# Patient Record
Sex: FEMALE | Race: WHITE | NOT HISPANIC OR LATINO | Employment: OTHER | URBAN - METROPOLITAN AREA
[De-identification: names, ages, dates, MRNs, and addresses within clinical notes are randomized per-mention and may not be internally consistent; named-entity substitution may affect disease eponyms.]

---

## 2017-02-08 RX ORDER — FLUTICASONE PROPIONATE 50 MCG
SPRAY, SUSPENSION (ML) NASAL
Qty: 1 BOTTLE | Refills: 1 | Status: SHIPPED | OUTPATIENT
Start: 2017-02-08 | End: 2017-03-14 | Stop reason: SDUPTHER

## 2017-03-02 ENCOUNTER — OFFICE VISIT (OUTPATIENT)
Dept: INTERNAL MEDICINE | Facility: CLINIC | Age: 82
End: 2017-03-02
Payer: MEDICARE

## 2017-03-02 VITALS
HEIGHT: 62 IN | RESPIRATION RATE: 16 BRPM | OXYGEN SATURATION: 98 % | DIASTOLIC BLOOD PRESSURE: 72 MMHG | BODY MASS INDEX: 21.06 KG/M2 | TEMPERATURE: 98 F | HEART RATE: 79 BPM | WEIGHT: 114.44 LBS | SYSTOLIC BLOOD PRESSURE: 140 MMHG

## 2017-03-02 DIAGNOSIS — R05.3 COUGH, PERSISTENT: Primary | ICD-10-CM

## 2017-03-02 PROCEDURE — 99213 OFFICE O/P EST LOW 20 MIN: CPT | Mod: S$GLB,,, | Performed by: INTERNAL MEDICINE

## 2017-03-02 RX ORDER — CODEINE PHOSPHATE AND GUAIFENESIN 10; 100 MG/5ML; MG/5ML
5 SOLUTION ORAL
Qty: 180 ML | Refills: 0 | Status: SHIPPED | OUTPATIENT
Start: 2017-03-02 | End: 2017-03-12

## 2017-03-02 RX ORDER — ROSUVASTATIN CALCIUM 10 MG/1
1 TABLET, FILM COATED ORAL DAILY
Refills: 6 | Status: ON HOLD | COMMUNITY
Start: 2017-02-07 | End: 2019-12-06 | Stop reason: HOSPADM

## 2017-03-02 NOTE — PROGRESS NOTES
"Subjective:      Patient ID: Mauri Matias is a 81 y.o. female.    Chief Complaint: Cough (pt c/o oughting for three days); Nasal Congestion; and Sore Throat    HPI: 81y/oWF, ill for 3 days with nasal congestion,sore throat and persistent cough.  No fever,chills,headache or earache.  Contact 2 weeks ago with a child, but child was asymptomatic at the time.  Tried "left over Vicodin" so she could sleep and this worked very well.    Review of Systems   Constitutional: Negative for chills, diaphoresis, fatigue and fever.   HENT: Positive for congestion and sore throat. Negative for ear pain, postnasal drip and sinus pressure.    Eyes: Negative.    Respiratory: Positive for cough. Negative for chest tightness, shortness of breath and wheezing.    Cardiovascular: Negative for chest pain and palpitations.   Gastrointestinal: Negative.    Endocrine: Negative.    Genitourinary: Negative.    Musculoskeletal: Negative for arthralgias, back pain, myalgias and neck pain.   Skin: Negative.    Allergic/Immunologic: Negative.    Neurological: Negative for dizziness, weakness, light-headedness and headaches.       Objective:   BP (!) 140/72 (BP Location: Left arm, Patient Position: Sitting, BP Method: Manual)  Pulse 79  Temp 98.2 °F (36.8 °C) (Oral)   Resp 16  Ht 5' 2" (1.575 m)  Wt 51.9 kg (114 lb 6.7 oz)  SpO2 98%  BMI 20.93 kg/m2    Physical Exam   Constitutional: She is oriented to person, place, and time. She appears well-developed and well-nourished. No distress.   HENT:   Head: Normocephalic and atraumatic.   Right Ear: External ear normal.   Left Ear: External ear normal.   Nose: Nose normal.   Mouth/Throat: Oropharynx is clear and moist.   Eyes: Conjunctivae and EOM are normal.   Neck: Normal range of motion. Neck supple. No JVD present.   Cardiovascular: Normal rate, regular rhythm and normal heart sounds.    Pulmonary/Chest: Effort normal and breath sounds normal. She has no wheezes. She has no rales. "   Abdominal: Soft. Bowel sounds are normal.   Musculoskeletal: Normal range of motion.   Lymphadenopathy:     She has no cervical adenopathy.   Neurological: She is alert and oriented to person, place, and time.   Skin: Skin is warm and dry. She is not diaphoretic.   Psychiatric: She has a normal mood and affect. Her behavior is normal.   Nursing note and vitals reviewed.      Assessment:     1. Cough, persistent      Plan:     Cough, persistent  -     guaifenesin-codeine 100-10 mg/5 ml (TUSSI-ORGANIDIN NR)  mg/5 mL syrup; Take 5 mLs by mouth 2 times daily 2 hours after meal.  Dispense: 180 mL; Refill: 0  -     POCT Influenza A/B    Fluids,rest,return if fever,chills,etc....

## 2017-03-02 NOTE — MR AVS SNAPSHOT
[facility]  [address] ,  [address]  [place] [address]  Phone: [phone]  Fax: [phone]                  Mauri Matias   3/2/2017 1:20 PM   Office Visit    Description:  Female : 1935   Provider:  Elda Torre MD   Department:  [facility]           Reason for Visit     Cough     Nasal Congestion     Sore Throat           Diagnoses this Visit        Comments    Cough, persistent    -  Primary            To Do List           Goals (5 Years of Data)     None       These Medications        Disp Refills Start End    guaifenesin-codeine 100-10 mg/5 ml (TUSSI-ORGANIDIN NR)  mg/5 mL syrup 180 mL 0 3/2/2017 3/12/2017    Take 5 mLs by mouth 2 times daily 2 hours after meal. - Oral    Pharmacy: [facility] - [place], [place] - [address] Ph #: [phone]         [facility]     [facility] Nurse Care Line -  Assistance  Registered nurses in the [facility] provide clinical advisement, health education, appointment booking, and other advisory services.  Call for this free service at [phone].             Medications           START taking these NEW medications        Refills    guaifenesin-codeine 100-10 mg/5 ml (TUSSI-ORGANIDIN NR)  mg/5 mL syrup 0    Sig: Take 5 mLs by mouth 2 times daily 2 hours after meal.    Class: Print    Route: Oral      STOP taking these medications     atorvastatin (LIPITOR) 40 MG tablet Take 40 mg by mouth every evening.    ticagrelor (BRILINTA) 90 mg tablet Take 1 tablet (90 mg total) by mouth 2 (two) times daily.    FOLIC ACID/MULTIVITS-MIN/LUT (CENTRUM SILVER ORAL) Take by mouth.    diclofenac sodium 1 % Gel APPLY 4 GRAMS TO AFFECTED AREA 4 TIMES A DAY    CYANOCOBALAMIN, VITAMIN B-12, (B-12 DOTS ORAL) Take by mouth.           Verify that the below list of medications is an accurate representation of the medications you are currently  taking.  If none reported, the list may be blank. If incorrect, please contact your healthcare provider. Carry this list with you in case of emergency.           Current Medications     aspirin 81 MG Chew Take 1 tablet (81 mg total) by mouth once daily.    cholecalciferol, vitamin D3, (VITAMIN D3) 5,000 unit Tab Take 5,000 Units by mouth once daily.    fish oil-omega-3 fatty acids 300-1,000 mg capsule Take 2 g by mouth once daily.    fluticasone (FLONASE) 50 mcg/actuation nasal spray instill 1 SPRAY IN EACH NOSTRIL TWICE A DAY    levothyroxine (SYNTHROID) 25 MCG tablet Take 1 tablet (25 mcg total) by mouth before breakfast.    losartan (COZAAR) 25 MG tablet Take 25 mg by mouth once daily.    nitroGLYCERIN (NITROSTAT) 0.4 MG SL tablet Place 1 tablet (0.4 mg total) under the tongue every 5 (five) minutes as needed for Chest pain (Every 5 minutes up to 3 doses.).    CRESTOR 10 mg tablet Take 1 tablet by mouth Daily.    guaifenesin-codeine 100-10 mg/5 ml (TUSSI-ORGANIDIN NR)  mg/5 mL syrup Take 5 mLs by mouth 2 times daily 2 hours after meal.           Clinical Reference Information           Your Vitals Were     BP                   140/72 (BP Location: Left arm, Patient Position: Sitting, BP Method: Manual)           Blood Pressure          Most Recent Value    BP  (!)  140/72      Allergies as of 3/2/2017     Penicillins      Immunizations Administered on Date of Encounter - 3/2/2017     None      [facility] Sign-Up     Activating your [facility] account is as easy as 1-2-3!     1) Visit [email or web address], select Sign Up Now, enter this activation code and your date of birth, then select Next.  [email or web address]  Expires: 4/16/2017  2:16 PM      2) Create a username and password to use when you visit [facility] in the future and select a security question in case you lose your password and select Next.    3) Enter your e-mail address and click Sign Up!    Additional Information  If you have questions, please e-mail  [email or web address] or call [phone] to talk to our [facility] staff. Remember, [facility] is NOT to be used for urgent needs. For medical emergencies, dial 911.         Language Assistance Services     ATTENTION: Language assistance services are available, free of charge. Please call [phone].      ATENCIÓN: Si habla español, tiene a mariscal disposición servicios gratuitos de asistencia lingüística. Llame al [phone].     CHÚ Ý: N?u b?n nói Ti?ng Vi?t, có các d?ch v? h? tr? ngôn ng? mi?n phí dành cho b?n. G?i s? [phone].         [facility] complies with applicable Federal civil rights laws and does not discriminate on the basis of race, color, national origin, age, disability, or sex.

## 2017-03-14 RX ORDER — FLUTICASONE PROPIONATE 50 MCG
SPRAY, SUSPENSION (ML) NASAL
Qty: 1 BOTTLE | Refills: 1 | Status: SHIPPED | OUTPATIENT
Start: 2017-03-14 | End: 2018-11-20

## 2018-08-03 ENCOUNTER — TELEPHONE (OUTPATIENT)
Dept: FAMILY MEDICINE | Facility: CLINIC | Age: 83
End: 2018-08-03

## 2018-08-03 DIAGNOSIS — Z12.31 ENCOUNTER FOR SCREENING MAMMOGRAM FOR BREAST CANCER: Primary | ICD-10-CM

## 2018-08-03 NOTE — TELEPHONE ENCOUNTER
----- Message from Oscar Harris sent at 8/3/2018  9:52 AM CDT -----  Contact: self/[phone]  Patient would like orders put in the system for her Mammogram.     Please call and advise when done.

## 2018-08-03 NOTE — TELEPHONE ENCOUNTER
----- Message from Diann Harris sent at 8/3/2018  2:39 PM CDT -----  Contact: [phone] or [phone]/ self  Patient would like orders put in for a mammogram. Please advise.

## 2018-08-28 ENCOUNTER — OFFICE VISIT (OUTPATIENT)
Dept: INTERNAL MEDICINE | Facility: CLINIC | Age: 83
End: 2018-08-28
Payer: MEDICARE

## 2018-08-28 VITALS
TEMPERATURE: 97 F | SYSTOLIC BLOOD PRESSURE: 128 MMHG | HEART RATE: 77 BPM | RESPIRATION RATE: 18 BRPM | HEIGHT: 62 IN | WEIGHT: 113 LBS | DIASTOLIC BLOOD PRESSURE: 82 MMHG | BODY MASS INDEX: 20.8 KG/M2 | OXYGEN SATURATION: 97 %

## 2018-08-28 DIAGNOSIS — I10 ESSENTIAL HYPERTENSION: Primary | Chronic | ICD-10-CM

## 2018-08-28 DIAGNOSIS — N39.46 MIXED INCONTINENCE: ICD-10-CM

## 2018-08-28 DIAGNOSIS — N64.4 MASTALGIA: ICD-10-CM

## 2018-08-28 PROCEDURE — 99999 PR PBB SHADOW E&M-EST. PATIENT-LVL IV: CPT | Mod: PBBFAC,,, | Performed by: INTERNAL MEDICINE

## 2018-08-28 PROCEDURE — 99214 OFFICE O/P EST MOD 30 MIN: CPT | Mod: PBBFAC,PN | Performed by: INTERNAL MEDICINE

## 2018-08-28 PROCEDURE — 99213 OFFICE O/P EST LOW 20 MIN: CPT | Mod: S$PBB,,, | Performed by: INTERNAL MEDICINE

## 2018-08-28 RX ORDER — LOSARTAN POTASSIUM 50 MG/1
25 TABLET ORAL 2 TIMES DAILY
Refills: 11 | COMMUNITY
Start: 2018-08-07 | End: 2022-07-28 | Stop reason: SDUPTHER

## 2018-08-28 RX ORDER — LEVOTHYROXINE SODIUM 25 UG/1
25 TABLET ORAL DAILY
Refills: 11 | COMMUNITY
Start: 2018-08-07 | End: 2023-07-21

## 2018-08-28 NOTE — PROGRESS NOTES
"Subjective:      Patient ID: Mauri Matias is a 82 y.o. female.    Chief Complaint: Breast Pain    HPI: 82 y.o. White female , several issues:  - MAMMOGRAM NEGATIVE.  Has had a problem with pain in her left breast. It is larger than the right one, and she has gone through  Several different bra, bra sizes looking for relief of the pulling down, irritaiion of the breast. No mastalgia  Per se, but it is uncomfortable.  -HTN  Brings in a log of her measurements: 111-152/66-82  .  She sees Dr. Weiss, who is aware of her BP fluctuations.  Currently also brings in his lab results, all normal.  -Urinary incontinence, due to overflow/overactive.  Vesicare made her dizzy. She already has vertigo, and does not want to try anything else.  -Sees [facility], because she has had sciatica, and BP fluctuations after  A spinal block.    Otherwise doing well.      Review of Systems   Constitutional: Negative.    HENT: Negative.    Eyes: Negative.    Respiratory: Negative for cough, chest tightness, shortness of breath and wheezing.    Cardiovascular: Negative for chest pain, palpitations and leg swelling.   Gastrointestinal: Negative.    Endocrine: Negative.    Genitourinary: Positive for enuresis, frequency and urgency. Negative for dysuria.        Incontinence,dribbling   Musculoskeletal: Negative.    Skin: Negative.    Allergic/Immunologic: Negative.    Neurological: Positive for dizziness. Negative for seizures, weakness and headaches.   Hematological: Negative.    Psychiatric/Behavioral: Negative.        Objective:   /82 (BP Location: Left arm, Patient Position: Sitting, BP Method: Small (Manual))   Pulse 77   Temp 97.2 °F (36.2 °C)   Resp 18   Ht 5' 2" (1.575 m)   Wt 51.3 kg (113 lb)   SpO2 97%   BMI 20.67 kg/m²     Physical Exam   Constitutional: She is oriented to person, place, and time. She appears well-developed and well-nourished.   HENT:   Head: Normocephalic and atraumatic.   Nose: Nose " normal.   Mouth/Throat: Oropharynx is clear and moist.   Eyes: Conjunctivae are normal.   Neck: Normal range of motion. Neck supple.   Cardiovascular: Normal rate, regular rhythm and normal heart sounds.       Significantly lower and larger breast.  No masses,nipple discharge.   Pulmonary/Chest: Effort normal and breath sounds normal.   Abdominal: Soft. Bowel sounds are normal.   Musculoskeletal: Normal range of motion.   Neurological: She is alert and oriented to person, place, and time.   Skin: Skin is warm and dry.   Psychiatric: She has a normal mood and affect. Her behavior is normal.   Nursing note and vitals reviewed.      Assessment:     1. Essential hypertension    2. Mixed incontinence    3. Mastalgia    Shown how to use a underwire bra on one side, lift shoulder strap to lift left breast.  Has a negative physical exam and mammogram test. She does not want breast reduction  Surgery at this time.  Plan:     Essential hypertension    Mixed incontinence    Mastalgia

## 2018-11-20 ENCOUNTER — OFFICE VISIT (OUTPATIENT)
Dept: FAMILY MEDICINE | Facility: CLINIC | Age: 83
End: 2018-11-20
Payer: MEDICARE

## 2018-11-20 ENCOUNTER — TELEPHONE (OUTPATIENT)
Dept: INTERNAL MEDICINE | Facility: CLINIC | Age: 83
End: 2018-11-20

## 2018-11-20 VITALS
DIASTOLIC BLOOD PRESSURE: 88 MMHG | RESPIRATION RATE: 16 BRPM | OXYGEN SATURATION: 97 % | SYSTOLIC BLOOD PRESSURE: 130 MMHG | WEIGHT: 119.63 LBS | BODY MASS INDEX: 22.01 KG/M2 | HEIGHT: 62 IN | TEMPERATURE: 98 F | HEART RATE: 71 BPM

## 2018-11-20 DIAGNOSIS — H92.01 OTALGIA OF RIGHT EAR: Primary | ICD-10-CM

## 2018-11-20 PROBLEM — H90.11 CONDUCTIVE HEARING LOSS OF RIGHT EAR WITH UNRESTRICTED HEARING OF LEFT EAR: Status: ACTIVE | Noted: 2018-11-20

## 2018-11-20 PROBLEM — Z95.5 HISTORY OF CORONARY ARTERY STENT PLACEMENT: Status: ACTIVE | Noted: 2018-11-20

## 2018-11-20 PROCEDURE — 99213 OFFICE O/P EST LOW 20 MIN: CPT | Mod: S$PBB,,, | Performed by: FAMILY MEDICINE

## 2018-11-20 PROCEDURE — 99999 PR PBB SHADOW E&M-EST. PATIENT-LVL IV: CPT | Mod: PBBFAC,,, | Performed by: FAMILY MEDICINE

## 2018-11-20 PROCEDURE — 99214 OFFICE O/P EST MOD 30 MIN: CPT | Mod: PBBFAC,PN | Performed by: FAMILY MEDICINE

## 2018-11-20 NOTE — PROGRESS NOTES
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 female  - with CAD, pEFHF, hypothyroidism and HTN presents with concerns for a right ear infection. PCP Dr.Tlaloc SHELL Trore MD. Here for urgent care visit. Pt reports pain posterior to her right ear that started 1 week. Fever: none. Hearing loss: right ear chronic and seen by Dr. Green ENT. Prior ear infections: none. Pain described as achy and burning that waxes and wanes. Pain 8/10 at worst and at best 4/10. No headache. Reports chronic sinus issues and only using nasal saline as needed. No dizziness or lightheadedness        ROS: Review of Systems   Constitutional: Negative for activity change, fatigue and fever.   HENT: Positive for ear pain, hearing loss (right side chronic) and rhinorrhea. Negative for congestion, ear discharge, facial swelling, nosebleeds, sinus pressure, sinus pain, sneezing, sore throat, tinnitus, trouble swallowing and voice change.    Eyes: Negative for photophobia, pain, discharge, redness, itching and visual disturbance.   Respiratory: Negative for cough, chest tightness and shortness of breath.    Cardiovascular: Negative for chest pain and palpitations.   Gastrointestinal: Negative for abdominal pain, diarrhea, nausea and vomiting.   Musculoskeletal: Negative for neck pain and neck stiffness.   Neurological: Negative for dizziness, light-headedness and headaches.       Past Medical History:   Diagnosis Date    CAD (coronary atherosclerotic disease)      "Hypertension        Past Surgical History:   Procedure Laterality Date    CORONARY STENT PLACEMENT      x2, Xience mid LAD, prox LAD    HEART CATH-LEFT Right 8/2/2016    [facility]    HYSTERECTOMY      TONSILLECTOMY         ALLERGIES:   Review of patient's allergies indicates:   Allergen Reactions    Penicillins Rash       MEDS:   Current Outpatient Medications:     aspirin 81 MG Chew, Take 1 tablet (81 mg total) by mouth once daily., Disp: , Rfl: 0    CRESTOR 10 mg tablet, Take 1 tablet by mouth Daily., Disp: , Rfl: 6    fish oil-omega-3 fatty acids 300-1,000 mg capsule, Take 2 g by mouth once daily., Disp: , Rfl:     levothyroxine (SYNTHROID) 25 MCG tablet, Take 25 mcg by mouth once daily., Disp: , Rfl: 11    losartan (COZAAR) 50 MG tablet, Take 50 mg by mouth 2 (two) times daily., Disp: , Rfl: 11    metoprolol tartrate (LOPRESSOR) 50 MG tablet, Take 50 mg by mouth 2 (two) times daily., Disp: , Rfl:     nitroGLYCERIN (NITROSTAT) 0.4 MG SL tablet, Place 1 tablet (0.4 mg total) under the tongue every 5 (five) minutes as needed for Chest pain (Every 5 minutes up to 3 doses.)., Disp: 30 tablet, Rfl: 0    OBJECTIVE:   Vitals:    11/20/18 1441   BP: 130/88   BP Location: Left arm   Patient Position: Sitting   BP Method: Medium (Manual)   Pulse: 71   Resp: 16   Temp: 97.9 °F (36.6 °C)   TempSrc: Oral   SpO2: 97%   Weight: 54.3 kg (119 lb 9.6 oz)   Height: 5' 2" (1.575 m)     Body mass index is 21.88 kg/m².    Physical Exam   Constitutional: No distress.   HENT:   Head: Normocephalic and atraumatic.       Right Ear: Ear canal normal. Tympanic membrane is not erythematous, not retracted and not bulging. A middle ear effusion is present.   Left Ear: Ear canal normal. Tympanic membrane is not erythematous, not retracted and not bulging. A middle ear effusion is present.   Nose: Rhinorrhea present. No mucosal edema. Right sinus exhibits no maxillary sinus tenderness and " no frontal sinus tenderness. Left sinus exhibits no maxillary sinus tenderness and no frontal sinus tenderness.   Neck: Trachea normal. Neck supple. No thyroid mass present.   Cardiovascular: Normal rate, regular rhythm and normal heart sounds.   Pulmonary/Chest: Effort normal and breath sounds normal.   Lymphadenopathy:     She has no cervical adenopathy.      ASSESSMENT/PLAN:  Problem List Items Addressed This Visit        ENT    Otalgia of right ear - Primary    Current Assessment & Plan     - no signs of infection  - overlying right mastoid without fever or induration  - rec ice   - rec evaluation by her ENT Dr. Green             Follow-up as needed if no improvement.     Dr. Nena Corona D.O.   Family Medicine

## 2018-11-20 NOTE — TELEPHONE ENCOUNTER
----- Message from Juliet Almazan sent at 11/20/2018  9:16 AM CST -----  Contact: Pt's son Hank Mckinney is calling in regards to pt being seen on today. Per son, pt has an right ear infection. The first available appt is on tomorrow. He would like a call back to schedule something for today.     She can be reached at [phone].    Thank you

## 2018-11-20 NOTE — ASSESSMENT & PLAN NOTE
- no signs of infection  - overlying right mastoid without fever or induration  - rec ice   - rec evaluation by her ENT Dr. Green

## 2019-04-17 ENCOUNTER — TELEPHONE (OUTPATIENT)
Dept: FAMILY MEDICINE | Facility: CLINIC | Age: 84
End: 2019-04-17

## 2019-04-17 RX ORDER — NITROGLYCERIN 0.4 MG/1
TABLET SUBLINGUAL
Refills: 1 | COMMUNITY
Start: 2019-03-21

## 2019-04-17 RX ORDER — AZITHROMYCIN 250 MG/1
TABLET, FILM COATED ORAL
Refills: 0 | COMMUNITY
Start: 2019-01-31 | End: 2019-09-04

## 2019-04-17 RX ORDER — METHYLPREDNISOLONE 4 MG/1
TABLET ORAL
Refills: 0 | COMMUNITY
Start: 2019-01-25 | End: 2019-09-04

## 2019-04-17 NOTE — TELEPHONE ENCOUNTER
----- Message from Camilla Dye sent at 4/17/2019  2:37 PM CDT -----  Contact: [phone]/self  Pt called to update pharm to [facility] in [place] phone# [phone].

## 2019-08-16 ENCOUNTER — TELEPHONE (OUTPATIENT)
Dept: FAMILY MEDICINE | Facility: CLINIC | Age: 84
End: 2019-08-16

## 2019-08-16 DIAGNOSIS — Z12.39 BREAST CANCER SCREENING: Primary | ICD-10-CM

## 2019-08-16 NOTE — TELEPHONE ENCOUNTER
----- Message from Analilia Leblanc sent at 8/16/2019 10:43 AM CDT -----  Contact: Self [phone]  Patient would like orders put in the system for her mammogram.  Please advise

## 2019-08-19 ENCOUNTER — TELEPHONE (OUTPATIENT)
Dept: INTERNAL MEDICINE | Facility: CLINIC | Age: 84
End: 2019-08-19

## 2019-08-19 NOTE — TELEPHONE ENCOUNTER
I called patient. I got her voice mail. I left a massage for patient to call office back. She requesting lab results from Dr. Weiss. Dr. Simms lab are not in the system. Patient will have to call Dr. Weiss office for her lab results. Vf/ma

## 2019-08-19 NOTE — TELEPHONE ENCOUNTER
----- Message from Marilyn Rios sent at 8/19/2019  3:48 PM CDT -----  Contact: [phone]/self  Patient is asking you to request test results (annual physical and lab work) from Dr. Weiss - cardiology. Please advise.

## 2019-08-20 ENCOUNTER — TELEPHONE (OUTPATIENT)
Dept: FAMILY MEDICINE | Facility: CLINIC | Age: 84
End: 2019-08-20

## 2019-08-20 NOTE — TELEPHONE ENCOUNTER
----- Message from Marilyn Rios sent at 8/20/2019 10:20 AM CDT -----  Contact: [phone]  Patient states Dr. Weiss office wants you to contact them for patient's lab results. Please advise.

## 2019-08-21 ENCOUNTER — PATIENT OUTREACH (OUTPATIENT)
Dept: ADMINISTRATIVE | Facility: HOSPITAL | Age: 84
End: 2019-08-21

## 2019-09-04 ENCOUNTER — OFFICE VISIT (OUTPATIENT)
Dept: INTERNAL MEDICINE | Facility: CLINIC | Age: 84
End: 2019-09-04
Payer: MEDICARE

## 2019-09-04 VITALS
RESPIRATION RATE: 18 BRPM | HEIGHT: 62 IN | WEIGHT: 117.38 LBS | HEART RATE: 85 BPM | BODY MASS INDEX: 21.6 KG/M2 | TEMPERATURE: 98 F | SYSTOLIC BLOOD PRESSURE: 130 MMHG | OXYGEN SATURATION: 98 % | DIASTOLIC BLOOD PRESSURE: 84 MMHG

## 2019-09-04 DIAGNOSIS — Z98.61 CAD S/P PERCUTANEOUS CORONARY ANGIOPLASTY: ICD-10-CM

## 2019-09-04 DIAGNOSIS — R41.3 MEMORY CHANGES: Primary | ICD-10-CM

## 2019-09-04 DIAGNOSIS — M81.0 OSTEOPOROSIS, UNSPECIFIED OSTEOPOROSIS TYPE, UNSPECIFIED PATHOLOGICAL FRACTURE PRESENCE: ICD-10-CM

## 2019-09-04 DIAGNOSIS — I50.32 CHRONIC DIASTOLIC HEART FAILURE: ICD-10-CM

## 2019-09-04 DIAGNOSIS — I25.10 CAD S/P PERCUTANEOUS CORONARY ANGIOPLASTY: ICD-10-CM

## 2019-09-04 DIAGNOSIS — E03.9 ACQUIRED HYPOTHYROIDISM: ICD-10-CM

## 2019-09-04 DIAGNOSIS — I10 ESSENTIAL HYPERTENSION: Chronic | ICD-10-CM

## 2019-09-04 PROCEDURE — 99213 OFFICE O/P EST LOW 20 MIN: CPT | Mod: PBBFAC,PN | Performed by: INTERNAL MEDICINE

## 2019-09-04 PROCEDURE — 99213 PR OFFICE/OUTPT VISIT, EST, LEVL III, 20-29 MIN: ICD-10-PCS | Mod: S$PBB,,, | Performed by: INTERNAL MEDICINE

## 2019-09-04 PROCEDURE — 99213 OFFICE O/P EST LOW 20 MIN: CPT | Mod: S$PBB,,, | Performed by: INTERNAL MEDICINE

## 2019-09-04 PROCEDURE — 99999 PR PBB SHADOW E&M-EST. PATIENT-LVL III: CPT | Mod: PBBFAC,,, | Performed by: INTERNAL MEDICINE

## 2019-09-04 PROCEDURE — 99999 PR PBB SHADOW E&M-EST. PATIENT-LVL III: ICD-10-PCS | Mod: PBBFAC,,, | Performed by: INTERNAL MEDICINE

## 2019-09-04 NOTE — PROGRESS NOTES
INTERNAL MEDICINE    Patient Active Problem List   Diagnosis    Allergic rhinitis due to pollen    Essential hypertension    Mixed incontinence    Chronic diastolic heart failure    CAD S/P percutaneous coronary angioplasty    Acquired hypothyroidism    History of coronary artery stent placement    Conductive hearing loss of right ear with unrestricted hearing of left ear    Otalgia of right ear       CC:   Chief Complaint   Patient presents with    Follow-up    Immunizations       SUBJECTIVE:  Mauri Matias   is a 83 y.o. female  HPI    83y/oWF here to have a general follow up.  She has known CAD disease,HTN,hypothyroidism, but is doing very well from that standpoint.  She is the mother of Dr. Matias ( [employer]).  Since being on Lyrica, she has been able to get around with less pain, however, she has significant spinal stenosis. This had impaired her mobility significantly.  She states she is concerned about her memory, and wonders if there are other issues impeding this.        She goes to see Dr. Weiss, and he does her labs, but unfortunately we do not have copies of her labs.    ROS: Review of Systems   Constitutional: Negative.    HENT: Positive for hearing loss.    Eyes: Negative.    Respiratory: Negative.    Cardiovascular: Negative.    Gastrointestinal: Negative.    Endocrine: Negative.    Genitourinary: Negative.    Musculoskeletal: Positive for arthralgias, back pain and gait problem. Negative for joint swelling and myalgias.   Skin: Negative.    Hematological: Negative.    Psychiatric/Behavioral: Positive for decreased concentration.       Past Medical History:   Diagnosis Date    CAD (coronary atherosclerotic disease)     Hypertension        Past Surgical History:   Procedure Laterality Date    CORONARY STENT PLACEMENT      x2, Xience mid LAD, prox LAD    HEART CATH-LEFT Right 8/2/2016    Performed by [facility] at [facility] CATH LAB    HYSTERECTOMY      TONSILLECTOMY    "  Has 2 stents placed in mid LAD and Prox LAD.    Family History   Problem Relation Age of Onset    Hypertension Mother     Kidney failure Mother     Parkinsonism Father     Heart attack Father        Social History     Tobacco Use    Smoking status: Never Smoker    Smokeless tobacco: Never Used   Substance Use Topics    Alcohol use: No    Drug use: No       Social History     Social History Narrative    Not on file       ALLERGIES:   Review of patient's allergies indicates:   Allergen Reactions    Penicillins Rash       MEDS:   Current Outpatient Medications:     aspirin 81 MG Chew, Take 1 tablet (81 mg total) by mouth once daily., Disp: , Rfl: 0    CRESTOR 10 mg tablet, Take 1 tablet by mouth Daily., Disp: , Rfl: 6    fish oil-omega-3 fatty acids 300-1,000 mg capsule, Take 2 g by mouth once daily., Disp: , Rfl:     levothyroxine (SYNTHROID) 25 MCG tablet, Take 25 mcg by mouth once daily., Disp: , Rfl: 11    losartan (COZAAR) 50 MG tablet, Take 50 mg by mouth 2 (two) times daily., Disp: , Rfl: 11    metoprolol tartrate (LOPRESSOR) 50 MG tablet, Take 50 mg by mouth 2 (two) times daily., Disp: , Rfl:     nitroGLYCERIN (NITROSTAT) 0.4 MG SL tablet, TAKE 1 TAB UNDER THE TONGUE EVERY 5 MIN X3 DOSES IF NEEDED FOR CHEST PAIN,IF NO RELIEF GO TO THE ER., Disp: , Rfl: 1    OBJECTIVE:   Vitals:    09/04/19 0820   BP: 130/84   BP Location: Left arm   Patient Position: Sitting   BP Method: Large (Manual)   Pulse: 85   Resp: 18   Temp: 98 °F (36.7 °C)   TempSrc: Oral   SpO2: 98%   Weight: 53.3 kg (117 lb 6.4 oz)   Height: 5' 2" (1.575 m)     Body mass index is 21.47 kg/m².    Physical Exam   Constitutional: She appears well-developed and well-nourished.   HENT:   Head: Normocephalic and atraumatic.   Right Ear: External ear normal.   Left Ear: External ear normal.   Nose: Nose normal.   Mouth/Throat: Oropharynx is clear and moist.   Eyes: Conjunctivae and EOM are normal.   Neck: Neck supple. No JVD present. "   Cardiovascular: Normal rate, regular rhythm and normal heart sounds.   Pulmonary/Chest: Effort normal and breath sounds normal.   Abdominal: Soft. Bowel sounds are normal.   Musculoskeletal: Normal range of motion. She exhibits no edema or deformity.   Lymphadenopathy:     She has no cervical adenopathy.   Neurological: She is alert. She displays normal reflexes. She exhibits normal muscle tone.   Skin: Skin is warm and dry.   Psychiatric: She has a normal mood and affect. Her behavior is normal.   Nursing note and vitals reviewed.        PERTINENT RESULTS:   CBC:  No results for input(s): WBC, RBC, HGB, HCT, PLT, MCV, MCH, MCHC in the last 2160 hours.  CMP:  No results for input(s): GLU, CALCIUM, ALBUMIN, PROT, NA, K, CO2, CL, BUN, ALKPHOS, ALT, AST, BILITOT in the last 2160 hours.    Invalid input(s): CREATININ  URINALYSIS:  No results for input(s): COLORU, CLARITYU, SPECGRAV, PHUR, PROTEINUA, GLUCOSEU, BILIRUBINCON, BLOODU, WBCU, RBCU, BACTERIA, MUCUS, NITRITE, LEUKOCYTESUR, UROBILINOGEN, HYALINECASTS in the last 2160 hours.   LIPIDS:  No results for input(s): TSH, HDL, CHOL, TRIG, LDLCALC, CHOLHDL, NONHDLCHOL, TOTALCHOLEST in the last 2160 hours.          ASSESSMENT:  Problem List Items Addressed This Visit        Cardiac/Vascular    Chronic diastolic heart failure    CAD S/P percutaneous coronary angioplasty    Essential hypertension (Chronic)       Endocrine    Acquired hypothyroidism      Other Visit Diagnoses     Memory changes    -  Primary    Osteoporosis, unspecified osteoporosis type, unspecified pathological fracture presence              PLAN:      No orders of the defined types were placed in this encounter.    Obtain results of labs from Dr. Weiss.  Have asked pt is hold her lyrica to see if this alters her mentation.  I have asked permission to speak to her son about this, and about his observation of her mentation.    Follow-up with me in 1month.   Dr. Elda Torre  Internal Medicine

## 2019-09-05 ENCOUNTER — TELEPHONE (OUTPATIENT)
Dept: FAMILY MEDICINE | Facility: CLINIC | Age: 84
End: 2019-09-05

## 2019-09-05 NOTE — TELEPHONE ENCOUNTER
Spoke with both pt and pt's son, and have agreed on stopping the lyrica, in an effort to clear the mental fogginess, pt is feeling.  She will call back on Monday.(4days).  TSA

## 2019-09-05 NOTE — TELEPHONE ENCOUNTER
Patient state that Dr. Torre wanted to talk to her son of Dr. Matias and she wanted to give the number. [phone] AND she gives her permission for Dr. Torre to speak to him. Pt states he works at [employer] or call him on the cell. [phone]

## 2019-09-05 NOTE — TELEPHONE ENCOUNTER
----- Message from Shelbi Kulkarni sent at 9/5/2019 10:16 AM CDT -----  Contact: [phone]/self  Patient is requesting medical release form mail out to us. Please advise.

## 2019-09-06 NOTE — TELEPHONE ENCOUNTER
----- Message from Rosey Bhakta sent at 9/6/2019 11:09 AM CDT -----  Contact: Mauri Matias  No. [phone] Patient called to check status of receiving a replacement for Lyrica. She stated she stopped taking Lyrica per Dr. Torre's instructions, but is uncomfortable and needs a replacement. She said she spoke with someone in the office this morning, 9/6/19.

## 2019-09-06 NOTE — TELEPHONE ENCOUNTER
Spoke to patient and informed of the doctor findings. She stated that she tried not to take it and it made her nauseous but she ate some food and took one and will see how she feels

## 2019-09-06 NOTE — TELEPHONE ENCOUNTER
----- Message from Shelbi Kulkarni sent at 9/6/2019  7:07 AM CDT -----  Contact: [phone]/self  Patient requesting to speak with you regarding getting a alternative for Lyrica medication. Please advise.

## 2019-09-23 ENCOUNTER — TELEPHONE (OUTPATIENT)
Dept: INTERNAL MEDICINE | Facility: CLINIC | Age: 84
End: 2019-09-23

## 2019-09-23 DIAGNOSIS — Z87.898 HISTORY OF CONFUSION: ICD-10-CM

## 2019-09-23 DIAGNOSIS — E03.9 ACQUIRED HYPOTHYROIDISM: Primary | ICD-10-CM

## 2019-09-23 DIAGNOSIS — I10 ESSENTIAL HYPERTENSION: Chronic | ICD-10-CM

## 2019-09-23 DIAGNOSIS — Z98.61 CAD S/P PERCUTANEOUS CORONARY ANGIOPLASTY: ICD-10-CM

## 2019-09-23 DIAGNOSIS — I25.10 CAD S/P PERCUTANEOUS CORONARY ANGIOPLASTY: ICD-10-CM

## 2019-09-23 NOTE — TELEPHONE ENCOUNTER
----- Message from Abby Laws sent at 9/23/2019  4:45 PM CDT -----  Contact: [phone]/son/Dr. Matias    Type:  Test Results    Who Called:  Dr. Matias  Name of Test (Lab/Mammo/Etc):  bloodwork  Date of Test:  today  Ordering Provider:    Where the test was performed:  [facility]  Would the patient rather a call back or a response via [facility]?  call  Best Call Back Number:    Additional Information:   What are results and what to do?

## 2019-09-24 DIAGNOSIS — R41.3 MEMORY LOSS: Primary | ICD-10-CM

## 2019-09-25 ENCOUNTER — TELEPHONE (OUTPATIENT)
Dept: NEUROLOGY | Facility: CLINIC | Age: 84
End: 2019-09-25

## 2019-09-25 NOTE — TELEPHONE ENCOUNTER
----- Message from Irving Caballero MD sent at 9/25/2019  8:45 AM CDT -----  Needs to be worked in to schedule. Physician's mother. Memory workup.

## 2019-10-01 ENCOUNTER — TELEPHONE (OUTPATIENT)
Dept: FAMILY MEDICINE | Facility: CLINIC | Age: 84
End: 2019-10-01

## 2019-10-01 DIAGNOSIS — R10.9 ABDOMINAL PAIN, UNSPECIFIED ABDOMINAL LOCATION: Primary | ICD-10-CM

## 2019-10-01 DIAGNOSIS — R10.2 PELVIC PAIN: ICD-10-CM

## 2019-10-01 DIAGNOSIS — R11.2 NAUSEA AND VOMITING, INTRACTABILITY OF VOMITING NOT SPECIFIED, UNSPECIFIED VOMITING TYPE: ICD-10-CM

## 2019-10-01 NOTE — TELEPHONE ENCOUNTER
Called patient and notified that Dr. Torre ordered test for her after talking to her son. Called and notified patient and she stated that she would have to call back because she had to find a ride to get over. Unable to schedule at this time and advised patient to call us back when she knew or to call imaging and the number was given.

## 2019-10-02 ENCOUNTER — TELEPHONE (OUTPATIENT)
Dept: INTERNAL MEDICINE | Facility: CLINIC | Age: 84
End: 2019-10-02

## 2019-10-02 NOTE — TELEPHONE ENCOUNTER
----- Message from Gasper Erazo sent at 10/2/2019  9:50 AM CDT -----  Contact: Pt  Pt would like to be called back asap regarding the US orders.  Pt stated she doesn't want to schedule at this time    Pt can be reached at [phone]    Thanks

## 2019-10-02 NOTE — TELEPHONE ENCOUNTER
I called patient twice this morning in regards to her not wanting to do her ultras. I got patient voice mail, I left message for patient to call office back. Vf/ma

## 2019-10-02 NOTE — TELEPHONE ENCOUNTER
Patient called to say she does not want to keep her appointment with Dr. Irving Caballero. She has lots going on right now. She will think about seeing Dr. Caballero at a later date. Vf/ma

## 2019-10-03 ENCOUNTER — TELEPHONE (OUTPATIENT)
Dept: FAMILY MEDICINE | Facility: CLINIC | Age: 84
End: 2019-10-03

## 2019-10-03 NOTE — TELEPHONE ENCOUNTER
Spoke with patient. Pt states that she has already spoken with someone from the office, and no longer requires any assistance.

## 2019-10-03 NOTE — TELEPHONE ENCOUNTER
----- Message from Camilla Dye sent at 10/3/2019 10:37 AM CDT -----  Contact: [phone]-self  Pt is requesting a callback concerning her upcoming tests.

## 2019-10-03 NOTE — TELEPHONE ENCOUNTER
----- Message from Julián De La Cruz sent at 10/3/2019  3:07 PM CDT -----  Contact: YAZAN GARNICA [[id number]]  Name of Who is Calling: YAZAN GARNICA [[id number]]      What is the request in detail: Returning a call to Corrie in regards to coming in for 8 for her upcoming test. Please advise      Can the clinic reply by [facility]: no      What [facility] to [facility] Back [facility]: [phone]

## 2019-10-11 ENCOUNTER — TELEPHONE (OUTPATIENT)
Dept: INTERNAL MEDICINE | Facility: CLINIC | Age: 84
End: 2019-10-11

## 2019-10-11 NOTE — TELEPHONE ENCOUNTER
----- Message from Tammy Gregory sent at 10/11/2019  3:23 PM CDT -----  Contact: pT   Pt would like Dr Torre to contact her in regards to her test results.   She can be reached at [phone]    Thanks

## 2019-10-11 NOTE — TELEPHONE ENCOUNTER
----- Message from Gasper Erazo sent at 10/11/2019 10:22 AM CDT -----  Contact: Pt  Pt is calling for Ultrasound results(10/9/19).    Pt can be reached at [phone].    Thanks

## 2019-10-14 ENCOUNTER — PATIENT MESSAGE (OUTPATIENT)
Dept: INTERNAL MEDICINE | Facility: CLINIC | Age: 84
End: 2019-10-14

## 2019-10-14 DIAGNOSIS — K82.4 GALLBLADDER POLYP: Primary | ICD-10-CM

## 2019-10-14 DIAGNOSIS — R11.2 NAUSEA, VOMITING AND DIARRHEA: ICD-10-CM

## 2019-10-14 DIAGNOSIS — R19.7 NAUSEA, VOMITING AND DIARRHEA: ICD-10-CM

## 2019-10-14 NOTE — TELEPHONE ENCOUNTER
I have spoken with pt's son, and agree she should see Dr. Paola GALVEZ and get a Hida scan. Orders put in.  TSA

## 2019-10-17 ENCOUNTER — TELEPHONE (OUTPATIENT)
Dept: INTERNAL MEDICINE | Facility: CLINIC | Age: 84
End: 2019-10-17

## 2019-10-17 NOTE — TELEPHONE ENCOUNTER
----- Message from Rere Harris sent at 10/17/2019  9:00 AM CDT -----  Patient did want to make the appt for her HIDA scan. Patient stated she can not handled this at this time. She will call back to make at a later time. CHRISTEL

## 2019-11-07 DIAGNOSIS — R10.33 ABDOMINAL PAIN, PERIUMBILICAL: Primary | ICD-10-CM

## 2019-12-04 PROBLEM — I16.1 HYPERTENSIVE EMERGENCY: Status: ACTIVE | Noted: 2019-12-04

## 2019-12-04 PROBLEM — R07.9 CHEST PAIN: Status: ACTIVE | Noted: 2019-12-04

## 2019-12-04 PROBLEM — K21.9 GERD (GASTROESOPHAGEAL REFLUX DISEASE): Status: ACTIVE | Noted: 2019-12-04

## 2019-12-05 PROBLEM — I21.4 NSTEMI (NON-ST ELEVATED MYOCARDIAL INFARCTION): Status: ACTIVE | Noted: 2019-12-05

## 2020-01-21 ENCOUNTER — TELEPHONE (OUTPATIENT)
Dept: FAMILY MEDICINE | Facility: CLINIC | Age: 85
End: 2020-01-21

## 2020-01-21 NOTE — TELEPHONE ENCOUNTER
----- Message from Kayli Cortez sent at 1/21/2020 11:14 AM CST -----  Contact: Pt  Pt called to speak to the nurse regarding her care and would be a new pt and would like a call back prior to scheduling and can be reached at [phone]

## 2020-02-27 ENCOUNTER — TELEPHONE (OUTPATIENT)
Dept: FAMILY MEDICINE | Facility: CLINIC | Age: 85
End: 2020-02-27

## 2020-02-27 NOTE — TELEPHONE ENCOUNTER
Patient wanted to confirm that her records are in the system for , I also printed and mailed Dr. Bryant Bio to her

## 2020-02-27 NOTE — TELEPHONE ENCOUNTER
----- Message from Kira Nguyen sent at 2/27/2020 11:02 AM CST -----  Contact: [phone]-self  Patient is requesting a call back concerning pt would like a little bit of information on the Dr before her appt on 3/4, pt is new and has been a patient of Dr Torre for many years. Please call

## 2020-03-04 ENCOUNTER — OFFICE VISIT (OUTPATIENT)
Dept: FAMILY MEDICINE | Facility: CLINIC | Age: 85
End: 2020-03-04
Payer: MEDICARE

## 2020-03-04 VITALS
HEIGHT: 62 IN | HEART RATE: 86 BPM | DIASTOLIC BLOOD PRESSURE: 80 MMHG | BODY MASS INDEX: 19.78 KG/M2 | TEMPERATURE: 98 F | WEIGHT: 107.5 LBS | OXYGEN SATURATION: 94 % | SYSTOLIC BLOOD PRESSURE: 122 MMHG

## 2020-03-04 DIAGNOSIS — I25.10 CAD S/P PERCUTANEOUS CORONARY ANGIOPLASTY: Primary | ICD-10-CM

## 2020-03-04 DIAGNOSIS — K21.9 GASTROESOPHAGEAL REFLUX DISEASE, ESOPHAGITIS PRESENCE NOT SPECIFIED: ICD-10-CM

## 2020-03-04 DIAGNOSIS — I11.9 HYPERTENSIVE HEART DISEASE WITHOUT HEART FAILURE: ICD-10-CM

## 2020-03-04 DIAGNOSIS — E78.2 HYPERLIPIDEMIA, MIXED: ICD-10-CM

## 2020-03-04 DIAGNOSIS — Z98.61 CAD S/P PERCUTANEOUS CORONARY ANGIOPLASTY: Primary | ICD-10-CM

## 2020-03-04 DIAGNOSIS — E03.9 ACQUIRED HYPOTHYROIDISM: ICD-10-CM

## 2020-03-04 PROBLEM — I21.4 NSTEMI (NON-ST ELEVATED MYOCARDIAL INFARCTION): Status: RESOLVED | Noted: 2019-12-05 | Resolved: 2020-03-04

## 2020-03-04 PROBLEM — I16.1 HYPERTENSIVE EMERGENCY: Status: RESOLVED | Noted: 2019-12-04 | Resolved: 2020-03-04

## 2020-03-04 PROBLEM — R07.9 CHEST PAIN: Status: RESOLVED | Noted: 2019-12-04 | Resolved: 2020-03-04

## 2020-03-04 PROCEDURE — 99999 PR PBB SHADOW E&M-EST. PATIENT-LVL III: ICD-10-PCS | Mod: PBBFAC,,, | Performed by: INTERNAL MEDICINE

## 2020-03-04 PROCEDURE — 99999 PR PBB SHADOW E&M-EST. PATIENT-LVL III: CPT | Mod: PBBFAC,,, | Performed by: INTERNAL MEDICINE

## 2020-03-04 PROCEDURE — 99214 PR OFFICE/OUTPT VISIT, EST, LEVL IV, 30-39 MIN: ICD-10-PCS | Mod: S$PBB,,, | Performed by: INTERNAL MEDICINE

## 2020-03-04 PROCEDURE — 99213 OFFICE O/P EST LOW 20 MIN: CPT | Mod: PBBFAC,PN | Performed by: INTERNAL MEDICINE

## 2020-03-04 PROCEDURE — 99214 OFFICE O/P EST MOD 30 MIN: CPT | Mod: S$PBB,,, | Performed by: INTERNAL MEDICINE

## 2020-03-04 RX ORDER — ONDANSETRON 4 MG/1
4 TABLET, FILM COATED ORAL EVERY 8 HOURS PRN
COMMUNITY
End: 2022-08-22

## 2020-03-04 NOTE — PROGRESS NOTES
[facility] Note    Chief Complaint      Chief Complaint   Patient presents with    [facility]     new pcp    Weakness    Nausea       History of Present Illness      Mauri Matias is a 84 y.o. female with chronic conditions of CAD s/p PCI x2, HTN, HLD, hypothyroidism, GERD who presents today for: establish care and complaints.  Previously seen by Dr. Torre.  Had hospitalization in 12/2019 for chest pain, had angiogram and PCI to RCA.  Previously had PCI to LAD.  Started on plavix, continued aspirin and crestor.  Stopped metoprolol.  BP was elevated in hospital, reports home monitoring has been at goal.  Since discharge, denies chest pain with exertion or at rest.  Feels generally weak since discharge.  Pt has been advancing exercise.  Walking 0.5 mile most days.  Supposed to start cardiac rehab but trying to arrange transportation to [place].  Complains of nausea after eating.  Has seen Dr. Guevara GI, for GERD workup recently.  Was started on pantoprazole but discontinued after being discharge because her chest pain has improved.  Complains of depressed mood about her recent health changes.    CAD: As above.  Sees Dr. Weiss.  On ASA, plavix, losartan, crestor.    HTN: BP at goal at home on losartan.    HLD: Controlled on crestor.  Last LDL 56 9/2019.  Hypothyroidism: Controlled on synthroid. Last TSH normal 9/2019  GERD: Incompletely controlled on protonix.    Flu shot UTD.  Prevnar, pneumovax UTD.  Zostavax UTD.  Mammograms and Cscopes completed.    Past Medical History:  Past Medical History:   Diagnosis Date    CAD (coronary atherosclerotic disease)     Hypertension        Past Surgical History:   has a past surgical history that includes Hysterectomy; Tonsillectomy; Coronary stent placement; Left heart catheterization (Left, 12/5/2019); and Coronary angiography (N/A, 12/5/2019).    Family History:  family history includes Heart attack in her father; Hypertension in her  mother; Kidney failure in her mother; Parkinsonism in her father.     Social History:  Social History     Tobacco Use    Smoking status: Never Smoker    Smokeless tobacco: Never Used   Substance Use Topics    Alcohol use: No    Drug use: No       Review of Systems   Constitutional: Negative for chills, fever and malaise/fatigue.   Respiratory: Negative for shortness of breath.    Cardiovascular: Negative for chest pain.   Gastrointestinal: Negative for constipation, diarrhea, nausea and vomiting.   Skin: Negative for rash.   Neurological: Negative for weakness.        Medications:  Outpatient Encounter Medications as of 3/4/2020   Medication Sig Dispense Refill    aspirin 81 MG Chew Take 1 tablet (81 mg total) by mouth once daily.  0    clopidogrel (PLAVIX) 75 mg tablet Take 1 tablet (75 mg total) by mouth once daily. 30 tablet 11    levothyroxine (SYNTHROID) 25 MCG tablet Take 25 mcg by mouth once daily.  11    ondansetron (ZOFRAN) 4 MG tablet Take 4 mg by mouth every 8 (eight) hours as needed for Nausea.      acetaminophen (TYLENOL) 500 MG tablet Take 500 mg by mouth every 6 (six) hours as needed for Pain.      fish oil-omega-3 fatty acids 300-1,000 mg capsule Take 2 g by mouth once daily.      losartan (COZAAR) 50 MG tablet Take 50 mg by mouth 2 (two) times daily.  11    nitroGLYCERIN (NITROSTAT) 0.4 MG SL tablet TAKE 1 TAB UNDER THE TONGUE EVERY 5 MIN X3 DOSES IF NEEDED FOR CHEST PAIN,IF NO RELIEF GO TO THE ER.  1    pantoprazole (PROTONIX) 20 MG tablet Take 20 mg by mouth once daily.      rosuvastatin (CRESTOR) 40 MG Tab Take 1 tablet (40 mg total) by mouth Daily. (Patient not taking: Reported on 3/4/2020) 90 tablet 3     No facility-administered encounter medications on file as of 3/4/2020.        Allergies:  Review of patient's allergies indicates:   Allergen Reactions    Penicillins Rash       Health Maintenance:    There is no immunization history on file for this patient.   Health  "Maintenance   Topic Date Due    TETANUS VACCINE  11/19/1953    Pneumococcal Vaccine (65+ Low/Medium Risk) (1 of 2 - PCV13) 11/19/2001    DEXA SCAN  12/19/2020    Lipid Panel  09/23/2024        Physical Exam      Vital Signs  Temp: 97.7 °F (36.5 °C)  Temp src: Oral  Pulse: 86  SpO2: (!) 94 %  BP: 122/80  BP Location: Left arm  Patient Position: Sitting  Pain Score: 0-No pain  Height and Weight  Height: 5' 2" (157.5 cm)  Weight: 48.8 kg (107 lb 7.6 oz)  BSA (Calculated - sq m): 1.46 sq meters  BMI (Calculated): 19.7  Weight in (lb) to have BMI = 25: 136.4]    Physical Exam   Constitutional: She appears well-developed and well-nourished.   HENT:   Head: Normocephalic and atraumatic.   Right Ear: External ear normal.   Left Ear: External ear normal.   Mouth/Throat: Oropharynx is clear and moist.   Eyes: Pupils are equal, round, and reactive to light. Conjunctivae and EOM are normal.   Neck: Carotid bruit is not present.   Cardiovascular: Normal rate, regular rhythm, normal heart sounds and intact distal pulses.   No murmur heard.  Pulmonary/Chest: Effort normal and breath sounds normal. She has no wheezes. She has no rales.   Abdominal: Soft. Bowel sounds are normal. She exhibits no distension. There is no hepatosplenomegaly. There is no tenderness.   Musculoskeletal: She exhibits no edema.   Vitals reviewed.       Laboratory:  CBC:  Recent Labs   Lab 12/04/19  1003 12/05/19  0426 12/06/19  0557   WBC 7.04 7.03 7.27   RBC 4.50 4.34 4.09   Hemoglobin 13.7 13.3 12.4   Hematocrit 41.6 39.6 37.8   Platelets 183 186 183   Mean Corpuscular Volume 92 91 92   Mean Corpuscular Hemoglobin 30.4 30.6 30.3   Mean Corpuscular Hemoglobin Conc 32.9 33.6 32.8     CMP:  Recent Labs   Lab 04/06/18  1737 09/23/19  1137 12/04/19  1003  12/06/19  0557   Glucose 134 H 102 97   < > 93   Calcium 9.4 9.3 9.1   < > 7.7 L   Albumin 4.2 4.5 4.1  --   --    Total Protein 7.0 7.5 7.1  --   --    Sodium 137 135 L 138   < > 135 L   Potassium 4.2 " 3.9 3.9   < > 4.0   CO2 30 H 26 29   < > 24   Chloride 97 99 100   < > 101   BUN, Bld 28 H 15 23 H   < > 20 H   Alkaline Phosphatase 67 73 71  --   --    ALT 34 16 13  --   --    AST 29 28 26  --   --    Total Bilirubin 0.4 0.6 0.5  --   --     < > = values in this interval not displayed.     URINALYSIS:  Recent Labs   Lab 09/23/19  1138   Color, UA Yellow   Specific Gravity, UA 1.020   pH, UA 6.0   Protein, UA Negative   Bacteria Many A   Nitrite, UA Positive A   Leukocytes, UA 1+ A   Urobilinogen, UA Negative   Hyaline Casts, UA 2 A      LIPIDS:  Recent Labs   Lab 09/23/19  1137   TSH 1.970   HDL 54   Cholesterol 120   Triglycerides 46   LDL Cholesterol 56.8 L   Hdl/Cholesterol Ratio 45.0   Non-HDL Cholesterol 66   Total Cholesterol/HDL Ratio 2.2     TSH:  Recent Labs   Lab 09/23/19  1137   TSH 1.970     A1C:        Assessment/Plan     Mauri Matias is a 84 y.o.female with:    1. CAD S/P percutaneous coronary angioplasty  Continue current meds.  F/U with Dr. Weiss.  Recommended to go ahead with cardiac rehab.    2. Hypertensive heart disease without heart failure  Continue current meds.    3. Hyperlipidemia, mixed  Continue current meds.  Reviewed labs. Update labs at next visit.  4. Acquired hypothyroidism  Continue current meds.  Reviewed labs.  5. Gastroesophageal reflux disease, esophagitis presence not specified  Will get record from Dr. Guevara.  LIkely restart protonix.      Chronic conditions status updated as per HPI.  Other than changes above, cont current medications and maintain follow up with specialists.  Return to clinic in 1-2 months.    West Leo MD  [facility] [place]

## 2020-03-11 RX ORDER — PANTOPRAZOLE SODIUM 20 MG/1
20 TABLET, DELAYED RELEASE ORAL DAILY
Qty: 30 TABLET | Refills: 3 | Status: SHIPPED | OUTPATIENT
Start: 2020-03-11 | End: 2020-07-23

## 2020-03-11 NOTE — TELEPHONE ENCOUNTER
----- Message from Kira Nguyen sent at 3/11/2020  4:10 PM CDT -----  Contact: [phone]-self  Patient is requesting a call back concerning her medication for pantoprazole (PROTONIX) 20 MG tablet not being sent to the pharmacy. Please call

## 2020-03-12 ENCOUNTER — TELEPHONE (OUTPATIENT)
Dept: FAMILY MEDICINE | Facility: CLINIC | Age: 85
End: 2020-03-12

## 2020-03-12 NOTE — TELEPHONE ENCOUNTER
Spoke with patient. Pt states that she is experiencing nausea, dizziness, and lightheadedness when taking the protonix. Pt would like Dr Leo's recommendations with this matter.   Informed patient that I would forward this information to Dr Leo and once he responds someone would give her a call back. Pt verbalizes understanding.

## 2020-03-12 NOTE — TELEPHONE ENCOUNTER
----- Message from Marilyn Rios sent at 3/12/2020  4:32 PM CDT -----  Contact: [phone]/self  Patient requesting to speak with you regarding side effects of medication pantoprazole (PROTONIX) 20 MG tablet. She is requesting to speak with Dr. Leo. Please advise.

## 2020-03-13 NOTE — TELEPHONE ENCOUNTER
She has taken protonix previously.  Did she have the same reaction?  When is she experiencing these symptoms (right after taking, later in the morning, after meals)?

## 2020-03-13 NOTE — TELEPHONE ENCOUNTER
That's ok to hold off for now.  But she should be taking it 30-60 minutes before eating for it to work properly.

## 2020-03-13 NOTE — TELEPHONE ENCOUNTER
Pt stated she ate breakfast then took the protonix. The symptoms started later in the morning she stated. Also stated that she don't remember if she had the same reaction before when she took this medicine. She stated she has other things going on right now and she is just not going to take the protonix.

## 2020-03-16 ENCOUNTER — TELEPHONE (OUTPATIENT)
Dept: PRIMARY CARE CLINIC | Facility: CLINIC | Age: 85
End: 2020-03-16

## 2020-03-16 NOTE — TELEPHONE ENCOUNTER
----- Message from Mohsen Dye sent at 3/16/2020 11:15 AM CDT -----  Type:  Needs Medical Advice    Who Called: patient  Reason:  She is just receiving the prescription for the protonix today; she needs to speak to the nurse she feels she in even more pain since she has dad to wait so long to get her medication.  Would the patient rather a call back or a response via [facility]? Call  Best Call Back Number: [phone]  Additional Information: no

## 2020-03-16 NOTE — TELEPHONE ENCOUNTER
There was a misunderstanding on last phone message from 03/13. Pt was given 40mg of protonix from her pharmacy which was a old rx from former doctor. Pt stated that it was strong. She is picking up the 20mg rx today. She is aware to take the medicine 30-60 before eating for it to work. She will call us back if she has any more problems.

## 2020-04-30 ENCOUNTER — TELEPHONE (OUTPATIENT)
Dept: FAMILY MEDICINE | Facility: CLINIC | Age: 85
End: 2020-04-30

## 2020-04-30 NOTE — TELEPHONE ENCOUNTER
----- Message from Renetta Alvarez sent at 4/30/2020  8:22 AM CDT -----  Contact: [phone]  Patient is requesting a call from the doctor because the patient is feeling fatigue four hours after eating and she is nauseated.   Please call and advise, thank you.

## 2020-04-30 NOTE — TELEPHONE ENCOUNTER
Spoke to pt. Said she woke up early because she felt weak, about every 4 hours she is having pains in her stomach like she is starving. When she woke up she ate a waffle. C/o nausea and weakness states its been alfredo on for about 3-4 days.

## 2020-05-01 NOTE — TELEPHONE ENCOUNTER
Called pt yesterday and discussed complaints.  STill with episodes of nausea/hunger, usually hours after eating.  Went through typical diet.  Has been taking protonix around lunchtime.  Will adjust to morning time.  Also discussed depression/anxiety.  Pt will measure blood sugar during symptoms.  Will also consider CT abdomen with IV and 24 hour urine 5HIAA to evaluate for carcinoid as it has been suggested in the past by Dr. Guevara.

## 2020-05-06 ENCOUNTER — TELEPHONE (OUTPATIENT)
Dept: FAMILY MEDICINE | Facility: CLINIC | Age: 85
End: 2020-05-06

## 2020-05-06 NOTE — TELEPHONE ENCOUNTER
----- Message from Keli Gama sent at 5/6/2020  2:08 PM CDT -----  Contact: pt  Pt would like to be called back regarding her heatlh      Pt can be reached at [phone]

## 2020-05-07 ENCOUNTER — OFFICE VISIT (OUTPATIENT)
Dept: FAMILY MEDICINE | Facility: CLINIC | Age: 85
End: 2020-05-07
Payer: MEDICARE

## 2020-05-07 DIAGNOSIS — Z98.61 CAD S/P PERCUTANEOUS CORONARY ANGIOPLASTY: ICD-10-CM

## 2020-05-07 DIAGNOSIS — K21.9 GASTROESOPHAGEAL REFLUX DISEASE, ESOPHAGITIS PRESENCE NOT SPECIFIED: ICD-10-CM

## 2020-05-07 DIAGNOSIS — R10.31 RIGHT LOWER QUADRANT PAIN: ICD-10-CM

## 2020-05-07 DIAGNOSIS — E03.9 ACQUIRED HYPOTHYROIDISM: ICD-10-CM

## 2020-05-07 DIAGNOSIS — I25.10 CAD S/P PERCUTANEOUS CORONARY ANGIOPLASTY: ICD-10-CM

## 2020-05-07 DIAGNOSIS — R53.1 WEAKNESS: ICD-10-CM

## 2020-05-07 DIAGNOSIS — E16.2 HYPOGLYCEMIA: ICD-10-CM

## 2020-05-07 DIAGNOSIS — R11.0 NAUSEA: Primary | ICD-10-CM

## 2020-05-07 PROCEDURE — 99443 PR PHYSICIAN TELEPHONE EVALUATION 21-30 MIN: CPT | Mod: 95,,, | Performed by: INTERNAL MEDICINE

## 2020-05-07 PROCEDURE — 99443 PR PHYSICIAN TELEPHONE EVALUATION 21-30 MIN: ICD-10-PCS | Mod: 95,,, | Performed by: INTERNAL MEDICINE

## 2020-05-07 NOTE — PROGRESS NOTES
Scheduled patient 1 month f/u. Patient will call office back to schedule labs because her  is painting and she has to see when he can bring her for blood work

## 2020-05-08 ENCOUNTER — TELEPHONE (OUTPATIENT)
Dept: PRIMARY CARE CLINIC | Facility: CLINIC | Age: 85
End: 2020-05-08

## 2020-05-08 NOTE — TELEPHONE ENCOUNTER
----- Message from Gill Alejandra sent at 5/8/2020  9:13 AM CDT -----  Contact: self, [phone]  Patient requests to speak with you regarding 5/12 appointments scheduled. Has questions regarding specimen drop off. Please advise.

## 2020-05-12 ENCOUNTER — TELEPHONE (OUTPATIENT)
Dept: PHARMACY | Facility: CLINIC | Age: 85
End: 2020-05-12

## 2020-05-12 ENCOUNTER — TELEPHONE (OUTPATIENT)
Dept: FAMILY MEDICINE | Facility: CLINIC | Age: 85
End: 2020-05-12

## 2020-05-12 DIAGNOSIS — R10.9 ABDOMINAL PAIN, UNSPECIFIED ABDOMINAL LOCATION: ICD-10-CM

## 2020-05-12 DIAGNOSIS — R53.1 WEAKNESS: ICD-10-CM

## 2020-05-12 DIAGNOSIS — R10.31 RIGHT LOWER QUADRANT PAIN: ICD-10-CM

## 2020-05-12 DIAGNOSIS — R11.0 NAUSEA: Primary | ICD-10-CM

## 2020-05-13 NOTE — TELEPHONE ENCOUNTER
Pt's son was picking up a kit for this meter from Dr. Ayers.  I think it has 2 14-day sensors in it.  Let's hold off on the prescription until we're sure she needs it.

## 2020-06-15 ENCOUNTER — TELEPHONE (OUTPATIENT)
Dept: FAMILY MEDICINE | Facility: CLINIC | Age: 85
End: 2020-06-15

## 2020-06-15 NOTE — TELEPHONE ENCOUNTER
Patient is not able to drive to appt and does not have her daughter to bring her, she also can not do the appt virtually. She is asking for you to do an Audio visit???

## 2020-06-15 NOTE — TELEPHONE ENCOUNTER
----- Message from Joao Giles sent at 6/15/2020  3:59 PM CDT -----  Contact: Patient// [phone]  Patient calling to speak with you concerning changing her appointment to a virtual appointment   Please call back to assist at [phone]

## 2020-06-17 ENCOUNTER — TELEPHONE (OUTPATIENT)
Dept: FAMILY MEDICINE | Facility: CLINIC | Age: 85
End: 2020-06-17

## 2020-06-17 DIAGNOSIS — K21.9 GASTROESOPHAGEAL REFLUX DISEASE, ESOPHAGITIS PRESENCE NOT SPECIFIED: Primary | ICD-10-CM

## 2020-06-17 NOTE — TELEPHONE ENCOUNTER
Discussed pt's progress.  Symptoms unchanged.  CGM does not show hypoglycemia during symptoms.  Discussed H pylori testing, pt amenable.  Will order.  Also Pt received letter informing her of Dr. Guevara's care home.  Will get list of other providers to transition care.

## 2020-06-18 ENCOUNTER — TELEPHONE (OUTPATIENT)
Dept: FAMILY MEDICINE | Facility: CLINIC | Age: 85
End: 2020-06-18

## 2020-06-18 NOTE — TELEPHONE ENCOUNTER
----- Message from Karena Serna sent at 6/18/2020  1:09 PM CDT -----  Regarding: Returning call  Contact: self [phone]  Patient is returning your call. Please call

## 2020-06-19 ENCOUNTER — TELEPHONE (OUTPATIENT)
Dept: FAMILY MEDICINE | Facility: CLINIC | Age: 85
End: 2020-06-19

## 2020-06-19 NOTE — TELEPHONE ENCOUNTER
----- Message from Analilia Leblanc sent at 6/19/2020  2:54 PM CDT -----  Contact: SELF [phone]  Patient is returning your call.  Please advise.

## 2020-06-19 NOTE — TELEPHONE ENCOUNTER
Called and spoke with pt in regards of her message. Pt states that she does not understand why she has to have testing for H.Pylori.  Please advise.

## 2020-06-22 NOTE — TELEPHONE ENCOUNTER
It is part of the workup she and I and her son,  Hank Montagueingue, discussed to find out why she's having symptoms of empty stomach after eating

## 2020-06-22 NOTE — TELEPHONE ENCOUNTER
Spoke with patient. Informed patient of Dr Leo's response. Pt still has concerns as to the need for the test. Pt would also like to know when is this test due, and what time of the day he needs her to collect it.   Informed patient that I would forward this message to Dr Leo and once he responds someone would give her a call back. Pt verbalizes understanding.

## 2020-06-25 ENCOUNTER — OFFICE VISIT (OUTPATIENT)
Dept: FAMILY MEDICINE | Facility: CLINIC | Age: 85
End: 2020-06-25
Payer: MEDICARE

## 2020-06-25 VITALS
HEART RATE: 94 BPM | HEIGHT: 62 IN | DIASTOLIC BLOOD PRESSURE: 80 MMHG | WEIGHT: 105.81 LBS | BODY MASS INDEX: 19.47 KG/M2 | OXYGEN SATURATION: 97 % | TEMPERATURE: 99 F | SYSTOLIC BLOOD PRESSURE: 142 MMHG

## 2020-06-25 DIAGNOSIS — Z98.61 CAD S/P PERCUTANEOUS CORONARY ANGIOPLASTY: ICD-10-CM

## 2020-06-25 DIAGNOSIS — I11.9 HYPERTENSIVE HEART DISEASE WITHOUT HEART FAILURE: ICD-10-CM

## 2020-06-25 DIAGNOSIS — I25.10 CAD S/P PERCUTANEOUS CORONARY ANGIOPLASTY: ICD-10-CM

## 2020-06-25 DIAGNOSIS — F33.0 MILD RECURRENT MAJOR DEPRESSION: Primary | ICD-10-CM

## 2020-06-25 DIAGNOSIS — K21.9 GASTROESOPHAGEAL REFLUX DISEASE, ESOPHAGITIS PRESENCE NOT SPECIFIED: ICD-10-CM

## 2020-06-25 PROCEDURE — 99999 PR PBB SHADOW E&M-EST. PATIENT-LVL III: ICD-10-PCS | Mod: PBBFAC,,, | Performed by: INTERNAL MEDICINE

## 2020-06-25 PROCEDURE — 99214 OFFICE O/P EST MOD 30 MIN: CPT | Mod: S$PBB,,, | Performed by: INTERNAL MEDICINE

## 2020-06-25 PROCEDURE — 99999 PR PBB SHADOW E&M-EST. PATIENT-LVL III: CPT | Mod: PBBFAC,,, | Performed by: INTERNAL MEDICINE

## 2020-06-25 PROCEDURE — 99214 PR OFFICE/OUTPT VISIT, EST, LEVL IV, 30-39 MIN: ICD-10-PCS | Mod: S$PBB,,, | Performed by: INTERNAL MEDICINE

## 2020-06-25 PROCEDURE — 99213 OFFICE O/P EST LOW 20 MIN: CPT | Mod: PBBFAC,PN | Performed by: INTERNAL MEDICINE

## 2020-06-25 RX ORDER — CITALOPRAM 10 MG/1
10 TABLET ORAL DAILY
Qty: 30 TABLET | Refills: 11 | Status: SHIPPED | OUTPATIENT
Start: 2020-06-25 | End: 2020-07-23

## 2020-06-29 ENCOUNTER — TELEPHONE (OUTPATIENT)
Dept: FAMILY MEDICINE | Facility: CLINIC | Age: 85
End: 2020-06-29

## 2020-06-29 PROBLEM — F33.0 MILD RECURRENT MAJOR DEPRESSION: Status: ACTIVE | Noted: 2020-06-29

## 2020-06-29 NOTE — TELEPHONE ENCOUNTER
----- Message from Analilia Leblanc sent at 6/29/2020  3:22 PM CDT -----  Contact: SELF [phone]  Patient would like to speak with you about a personal matter. Please advise

## 2020-06-29 NOTE — LETTER
June 30, 2020    Mauri Matias  [address] [place] [address] [address] [place] [id number]             [place] [address]  [place] [address]  Phone: [phone]  Fax: [phone] To whom it may concern:    Mauri Matias is a patient under my care as primary care physician.  As such, I have been trying to help improve her nutritional status.  I have encouraged her to reach out to community resources that provide meal delivery programs.  I understand that she has been denied services because her  still drives.  However, no one in their household drives more than very short distances due to their medical problems.  Please reconsider the decision to exclude Ms. Matias from meal deliveries as I think it would benefit her medically as it relates to her declining nutritional and cognitive status.  Please contact my office for any additional information.    Respectfully,         West Leo M.D.

## 2020-06-29 NOTE — TELEPHONE ENCOUNTER
Son () stated that Dr. Leo at last visit was supposed to fill out mother's [place] on aging form. Son is now requesting that form can be either faxed to [phone]. ATTN: Dr. Matias, or emailed to  [email or web address] . Please advise.

## 2020-06-29 NOTE — PROGRESS NOTES
[facility] Note    Chief Complaint      Chief Complaint   Patient presents with    Follow-up       History of Present Illness      Mauri Matias is a 84 y.o. female with chronic conditions of CAD s/p PCI x2, HTN, HLD, hypothyroidism, GERD who presents today for: follow up chronic conditions and persistent abdominal discomfort.  Pt continues to describe sensations of empty stomach and weakness few hours after eating.  Pt has had normal continuous glucose monitoring that did not demonstrate significant postprandial hypoglycemia.  Pt has been adjusting her diet to include more protein, more calories which has helped slightly.  Pt has not been motivated to cook for her just her and her  and not willing for meal delivery program.  Pt also reports anxiety and depression most days which may be a contributing factor.  Pt may have taken SSRI in the past but not recently and hesitant to start.  Pt relies on prayer to treat anxiety.    CAD: Sees DR. Weiss.  Denies chest pain, shortness of breath.  Pt has discussed above symptoms with Dr. Weiss who does not feel this is related to CAD.    GERD: Has seen DR. Guevara in the past who recently retired.  Has had a 3-4 month trial of PPI which did not improve symptoms.  HTN: BP slightly elevated today but anxious.  Generally home readings are well within goal.  Flu shot UTD.  Prevnar, pneumovax UTD.  Zostavax UTD.  Mammograms and Cscopes completed.    Past Medical History:  Past Medical History:   Diagnosis Date    CAD (coronary atherosclerotic disease)     Hypertension        Past Surgical History:   has a past surgical history that includes Hysterectomy; Tonsillectomy; Coronary stent placement; Left heart catheterization (Left, 12/5/2019); and Coronary angiography (N/A, 12/5/2019).    Family History:  family history includes Heart attack in her father; Hypertension in her mother; Kidney failure in her mother; Parkinsonism in her father.      Social History:  Social History     Tobacco Use    Smoking status: Never Smoker    Smokeless tobacco: Never Used   Substance Use Topics    Alcohol use: No    Drug use: No       Review of Systems   Constitutional: Negative for chills, fever and malaise/fatigue.   Respiratory: Negative for shortness of breath.    Cardiovascular: Negative for chest pain.   Gastrointestinal: Positive for abdominal pain and nausea. Negative for blood in stool, constipation, diarrhea, heartburn, melena and vomiting.   Skin: Negative for rash.   Neurological: Positive for weakness.        Medications:  Outpatient Encounter Medications as of 6/25/2020   Medication Sig Dispense Refill    aspirin (ASPIR-81 ORAL) Take by mouth.      clopidogrel (PLAVIX) 75 mg tablet Take 1 tablet (75 mg total) by mouth once daily. 30 tablet 11    container,empty (NASAL SPRAY BOTTLE MISC) by Misc.(Non-Drug; Combo Route) route.      levothyroxine (SYNTHROID) 25 MCG tablet Take 25 mcg by mouth once daily.  11    losartan (COZAAR) 50 MG tablet Take by mouth once daily.   11    ondansetron (ZOFRAN) 4 MG tablet Take 4 mg by mouth every 8 (eight) hours as needed for Nausea.      tetrahydrozoline/polyethyl gly (EYE DROPS OPHT) Apply to eye.      acetaminophen (TYLENOL) 500 MG tablet Take 500 mg by mouth every 6 (six) hours as needed for Pain.      aspirin 81 MG Chew Take 1 tablet (81 mg total) by mouth once daily.  0    citalopram (CELEXA) 10 MG tablet Take 1 tablet (10 mg total) by mouth once daily. 30 tablet 11    fish oil-omega-3 fatty acids 300-1,000 mg capsule Take 2 g by mouth once daily.      flash glucose scanning reader (FREESTYLE STAN 14 DAY READER) Misc Use as directed.  Dx hypoglycemia (Patient not taking: Reported on 6/25/2020) 1 each 0    flash glucose sensor (FREESTYLE STAN 14 DAY SENSOR) Kit Use as directed.  Dx hypoglycemia (Patient not taking: Reported on 6/25/2020) 2 kit 3    nitroGLYCERIN (NITROSTAT) 0.4 MG SL tablet TAKE 1  "TAB UNDER THE TONGUE EVERY 5 MIN X3 DOSES IF NEEDED FOR CHEST PAIN,IF NO RELIEF GO TO THE ER.  1    pantoprazole (PROTONIX) 20 MG tablet Take 1 tablet (20 mg total) by mouth once daily. (Patient not taking: Reported on 6/25/2020) 30 tablet 3    rosuvastatin (CRESTOR) 40 MG Tab Take 1 tablet (40 mg total) by mouth Daily. (Patient not taking: Reported on 3/4/2020) 90 tablet 3     No facility-administered encounter medications on file as of 6/25/2020.        Allergies:  Review of patient's allergies indicates:   Allergen Reactions    Penicillins Rash       Health Maintenance:    There is no immunization history on file for this patient.   Health Maintenance   Topic Date Due    TETANUS VACCINE  11/19/1953    Pneumococcal Vaccine (65+ Low/Medium Risk) (1 of 2 - PCV13) 11/19/2001    DEXA SCAN  12/19/2020    Lipid Panel  09/23/2024        Physical Exam      Vital Signs  Temp: 98.7 °F (37.1 °C)  Temp src: Oral  Pulse: 94  SpO2: 97 %  BP: (!) 142/80  BP Location: Right arm  Patient Position: Sitting  Height and Weight  Height: 5' 2" (157.5 cm)  Weight: 48 kg (105 lb 13.1 oz)  BSA (Calculated - sq m): 1.45 sq meters  BMI (Calculated): 19.3  Weight in (lb) to have BMI = 25: 136.4]    Physical Exam  Vitals signs reviewed.   Constitutional:       Appearance: She is well-developed.   HENT:      Head: Normocephalic and atraumatic.      Right Ear: External ear normal.      Left Ear: External ear normal.   Eyes:      Conjunctiva/sclera: Conjunctivae normal.      Pupils: Pupils are equal, round, and reactive to light.   Neck:      Vascular: No carotid bruit.   Cardiovascular:      Rate and Rhythm: Normal rate and regular rhythm.      Heart sounds: Normal heart sounds. No murmur.   Pulmonary:      Effort: Pulmonary effort is normal.      Breath sounds: Normal breath sounds. No wheezing or rales.   Abdominal:      General: Bowel sounds are normal. There is no distension.      Palpations: Abdomen is soft.      Tenderness: There " is no abdominal tenderness.          Laboratory:  CBC:  Recent Labs   Lab 12/05/19  0426 12/06/19  0557 05/12/20 0919   WBC 7.03 7.27 7.57   RBC 4.34 4.09 5.02   Hemoglobin 13.3 12.4 14.9   Hematocrit 39.6 37.8 46.4   Platelets 186 183 185   Mean Corpuscular Volume 91 92 92   Mean Corpuscular Hemoglobin 30.6 30.3 29.7   Mean Corpuscular Hemoglobin Conc 33.6 32.8 32.1     CMP:  Recent Labs   Lab 09/23/19  1137 12/04/19  1003  05/12/20 0919   Glucose 102 97   < > 105   Calcium 9.3 9.1   < > 9.7   Albumin 4.5 4.1  --  4.3   Total Protein 7.5 7.1  --  7.4   Sodium 135 L 138   < > 141   Potassium 3.9 3.9   < > 4.9   CO2 26 29   < > 32 H   Chloride 99 100   < > 102   BUN, Bld 15 23 H   < > 22 H   Alkaline Phosphatase 73 71  --  98   ALT 16 13  --  16   AST 28 26  --  30   Total Bilirubin 0.6 0.5  --  0.7    < > = values in this interval not displayed.     URINALYSIS:  Recent Labs   Lab 09/23/19  1138 05/12/20 0919   Color, UA Yellow Yellow   Specific Gravity, UA 1.020 1.025   pH, UA 6.0 7.0   Protein, UA Negative Negative   Bacteria Many A Many A   Nitrite, UA Positive A Positive A   Leukocytes, UA 1+ A Trace A   Urobilinogen, UA Negative Negative   Hyaline Casts, UA 2 A  --       LIPIDS:  Recent Labs   Lab 09/23/19  1137 05/12/20 0919   TSH 1.970 3.020   HDL 54  --    Cholesterol 120  --    Triglycerides 46  --    LDL Cholesterol 56.8 L  --    Hdl/Cholesterol Ratio 45.0  --    Non-HDL Cholesterol 66  --    Total Cholesterol/HDL Ratio 2.2  --      TSH:  Recent Labs   Lab 09/23/19  1137 05/12/20 0919   TSH 1.970 3.020     A1C:        Assessment/Plan     Mauri Matias is a 84 y.o.female with:    1. Mild recurrent major depression, new to me  - citalopram (CELEXA) 10 MG tablet; Take 1 tablet (10 mg total) by mouth once daily.  Dispense: 30 tablet; Refill: 11  Starting celexa.  RTC in 1 month to monitor progress.  2. CAD S/P percutaneous coronary angioplasty  Continue current meds.  F/U with Dr. Weiss as  scheduled  3. Hypertensive heart disease without heart failure  Continue current meds.  Monitor at home and call if > 140/90.  4. Gastroesophageal reflux disease, esophagitis presence not specified  Off meds 2/2 lack of benefit.    Chronic conditions status updated as per HPI.  Other than changes above, cont current medications and maintain follow up with specialists.  Return to clinic in 1 months.    West Leo MD  [facility]

## 2020-07-14 ENCOUNTER — TELEPHONE (OUTPATIENT)
Dept: FAMILY MEDICINE | Facility: CLINIC | Age: 85
End: 2020-07-14

## 2020-07-14 NOTE — TELEPHONE ENCOUNTER
----- Message from Diamante Hendricks sent at 7/14/2020  3:02 PM CDT -----  Name of Caller: Mauri   Reason for Visit/Symptoms: vaccinations  Best Contact Number or Confirm if Mychart Preferred: [phone]  Preferred Date/Time of Appointment: evenings please   Interested in Virtual Visit (yes/no) no  Additional Information: pt received letter that she is due for some shots, T-DAP and pneumonia vaccine, would like to know if she can receive her pneumonia vaccine locally, had pneumonia shot in fall 2019

## 2020-07-15 ENCOUNTER — TELEPHONE (OUTPATIENT)
Dept: FAMILY MEDICINE | Facility: CLINIC | Age: 85
End: 2020-07-15

## 2020-07-15 DIAGNOSIS — Z71.89 COMPLEX CARE COORDINATION: ICD-10-CM

## 2020-07-15 NOTE — TELEPHONE ENCOUNTER
----- Message from Joe Quiles sent at 7/15/2020 11:31 AM CDT -----  Type:  Needs Medical Advice    Who Called: self  Reason:Returning call  Would the patient rather a call back or a response via [facility]? callback  Best Call Back Number: [phone]  Additional Information: none

## 2020-07-15 NOTE — TELEPHONE ENCOUNTER
----- Message from Марина Bee sent at 7/15/2020 10:54 AM CDT -----  Contact: [phone]  Pt is requesting a callback in regards to rescheduling the upcoming appt to a later time due to having to receive the meals on wheels.    Please call and advise

## 2020-07-20 ENCOUNTER — TELEPHONE (OUTPATIENT)
Dept: FAMILY MEDICINE | Facility: CLINIC | Age: 85
End: 2020-07-20

## 2020-07-20 NOTE — TELEPHONE ENCOUNTER
Pt is asking about a stool card. Is it a fit kit that she needs? She stated she did the one at the lab already.

## 2020-07-20 NOTE — TELEPHONE ENCOUNTER
----- Message from Tiffany Gee sent at 7/17/2020  5:01 PM CDT -----  Pt is requesting a call back to speak with Nurse about getting stool cards     Please call and advise        Thank you

## 2020-07-23 ENCOUNTER — OFFICE VISIT (OUTPATIENT)
Dept: FAMILY MEDICINE | Facility: CLINIC | Age: 85
End: 2020-07-23
Payer: MEDICARE

## 2020-07-23 VITALS
HEART RATE: 94 BPM | DIASTOLIC BLOOD PRESSURE: 86 MMHG | WEIGHT: 106.13 LBS | SYSTOLIC BLOOD PRESSURE: 124 MMHG | TEMPERATURE: 98 F | OXYGEN SATURATION: 98 % | BODY MASS INDEX: 19.53 KG/M2 | HEIGHT: 62 IN

## 2020-07-23 DIAGNOSIS — R10.9 ABDOMINAL PAIN, UNSPECIFIED ABDOMINAL LOCATION: Primary | ICD-10-CM

## 2020-07-23 DIAGNOSIS — F33.0 MILD RECURRENT MAJOR DEPRESSION: ICD-10-CM

## 2020-07-23 PROCEDURE — 99214 OFFICE O/P EST MOD 30 MIN: CPT | Mod: S$PBB,,, | Performed by: INTERNAL MEDICINE

## 2020-07-23 PROCEDURE — 99999 PR PBB SHADOW E&M-EST. PATIENT-LVL IV: CPT | Mod: PBBFAC,,, | Performed by: INTERNAL MEDICINE

## 2020-07-23 PROCEDURE — 99999 PR PBB SHADOW E&M-EST. PATIENT-LVL IV: ICD-10-PCS | Mod: PBBFAC,,, | Performed by: INTERNAL MEDICINE

## 2020-07-23 PROCEDURE — 99214 PR OFFICE/OUTPT VISIT, EST, LEVL IV, 30-39 MIN: ICD-10-PCS | Mod: S$PBB,,, | Performed by: INTERNAL MEDICINE

## 2020-07-23 PROCEDURE — 99214 OFFICE O/P EST MOD 30 MIN: CPT | Mod: PBBFAC,PN | Performed by: INTERNAL MEDICINE

## 2020-07-23 RX ORDER — CITALOPRAM 20 MG/1
20 TABLET, FILM COATED ORAL DAILY
Qty: 30 TABLET | Refills: 11 | Status: SHIPPED | OUTPATIENT
Start: 2020-07-23 | End: 2020-11-11 | Stop reason: SDUPTHER

## 2020-07-23 RX ORDER — SIMETHICONE 125 MG
125 TABLET,CHEWABLE ORAL EVERY 6 HOURS PRN
COMMUNITY
End: 2022-08-22

## 2020-07-23 NOTE — PROGRESS NOTES
[facility] Note    Chief Complaint      Chief Complaint   Patient presents with    Follow-up     1 month follow up       History of Present Illness      Mauri Matias is a 84 y.o. female with chronic conditions of CAD s/p PCI x2, HTN, HLD, hypothyroidism, GERD who presents today for: follow up abdominal discomfort and depression.  Reports better control of depression since starting celexa.  Has noticed less episodes of abdominal discomfort.  Has been taking glucerna and had meals on wheels.  She has anxiety when having to fix a meal or leave the house.        Past Medical History:  Past Medical History:   Diagnosis Date    CAD (coronary atherosclerotic disease)     Hypertension        Past Surgical History:   has a past surgical history that includes Hysterectomy; Tonsillectomy; Coronary stent placement; Left heart catheterization (Left, 12/5/2019); and Coronary angiography (N/A, 12/5/2019).    Family History:  family history includes Heart attack in her father; Hypertension in her mother; Kidney failure in her mother; Parkinsonism in her father.     Social History:  Social History     Tobacco Use    Smoking status: Never Smoker    Smokeless tobacco: Never Used   Substance Use Topics    Alcohol use: No    Drug use: No       Review of Systems   Constitutional: Negative for chills, fever and malaise/fatigue.   Respiratory: Negative for shortness of breath.    Cardiovascular: Negative for chest pain.   Gastrointestinal: Negative for constipation, diarrhea, nausea and vomiting.   Skin: Negative for rash.   Neurological: Negative for weakness.        Medications:  Outpatient Encounter Medications as of 7/23/2020   Medication Sig Note Dispense Refill    acetaminophen (TYLENOL) 500 MG tablet Take 500 mg by mouth every 6 (six) hours as needed for Pain. 7/23/2020: As needed      aspirin (ASPIR-81 ORAL) Take by mouth.       citalopram (CELEXA) 20 MG tablet Take 1 tablet (20 mg total) by mouth  once daily.  30 tablet 11    clopidogrel (PLAVIX) 75 mg tablet Take 1 tablet (75 mg total) by mouth once daily.  30 tablet 11    container,empty (NASAL SPRAY BOTTLE MISC) by Misc.(Non-Drug; Combo Route) route.       levothyroxine (SYNTHROID) 25 MCG tablet Take 25 mcg by mouth once daily.   11    losartan (COZAAR) 50 MG tablet Take by mouth once daily.    11    nitroGLYCERIN (NITROSTAT) 0.4 MG SL tablet TAKE 1 TAB UNDER THE TONGUE EVERY 5 MIN X3 DOSES IF NEEDED FOR CHEST PAIN,IF NO RELIEF GO TO THE ER.   1    simethicone (MYLICON) 125 MG chewable tablet Take 125 mg by mouth every 6 (six) hours as needed for Flatulence. 7/23/2020: As needed      tetrahydrozoline/polyethyl gly (EYE DROPS OPHT) Apply to eye.       [DISCONTINUED] citalopram (CELEXA) 10 MG tablet Take 1 tablet (10 mg total) by mouth once daily.  30 tablet 11    fish oil-omega-3 fatty acids 300-1,000 mg capsule Take 2 g by mouth once daily.       flash glucose scanning reader (FREESTYLE STAN 14 DAY READER) [facility] Use as directed.  Dx hypoglycemia (Patient not taking: Reported on 6/25/2020)  1 each 0    flash glucose sensor (FREESTYLE STAN 14 DAY SENSOR) Kit Use as directed.  Dx hypoglycemia (Patient not taking: Reported on 6/25/2020)  2 kit 3    ondansetron (ZOFRAN) 4 MG tablet Take 4 mg by mouth every 8 (eight) hours as needed for Nausea.       pantoprazole (PROTONIX) 20 MG tablet Take 1 tablet (20 mg total) by mouth once daily. (Patient not taking: Reported on 6/25/2020)  30 tablet 3    rosuvastatin (CRESTOR) 40 MG Tab Take 1 tablet (40 mg total) by mouth Daily. (Patient not taking: Reported on 3/4/2020)  90 tablet 3     No facility-administered encounter medications on file as of 7/23/2020.        Allergies:  Review of patient's allergies indicates:   Allergen Reactions    Penicillins Rash       Health Maintenance:    There is no immunization history on file for this patient.   Health Maintenance   Topic Date Due    TETANUS VACCINE   "11/19/1953    Pneumococcal Vaccine (65+ Low/Medium Risk) (1 of 2 - PCV13) 11/19/2001    DEXA SCAN  12/19/2020    Lipid Panel  09/23/2024        Physical Exam      Vital Signs  Temp: 97.5 °F (36.4 °C)  Temp src: Oral  Pulse: 94  SpO2: 98 %  BP: 124/86  Pain Score: 0-No pain  Height and Weight  Height: 5' 2" (157.5 cm)  Weight: 48.1 kg (106 lb 2.4 oz)  BSA (Calculated - sq m): 1.45 sq meters  BMI (Calculated): 19.4  Weight in (lb) to have BMI = 25: 136.4]    Physical Exam  Vitals signs reviewed.   Constitutional:       Appearance: She is well-developed.   HENT:      Head: Normocephalic and atraumatic.      Right Ear: External ear normal.      Left Ear: External ear normal.   Eyes:      Conjunctiva/sclera: Conjunctivae normal.      Pupils: Pupils are equal, round, and reactive to light.   Neck:      Vascular: No carotid bruit.   Cardiovascular:      Rate and Rhythm: Normal rate and regular rhythm.      Heart sounds: Normal heart sounds. No murmur.   Pulmonary:      Effort: Pulmonary effort is normal.      Breath sounds: Normal breath sounds. No wheezing or rales.   Abdominal:      General: Bowel sounds are normal. There is no distension.      Palpations: Abdomen is soft.      Tenderness: There is no abdominal tenderness.          Laboratory:  CBC:  Recent Labs   Lab 12/05/19  0426 12/06/19  0557 05/12/20  0919   WBC 7.03 7.27 7.57   RBC 4.34 4.09 5.02   Hemoglobin 13.3 12.4 14.9   Hematocrit 39.6 37.8 46.4   Platelets 186 183 185   Mean Corpuscular Volume 91 92 92   Mean Corpuscular Hemoglobin 30.6 30.3 29.7   Mean Corpuscular Hemoglobin Conc 33.6 32.8 32.1     CMP:  Recent Labs   Lab 09/23/19  1137 12/04/19  1003  05/12/20  0919   Glucose 102 97   < > 105   Calcium 9.3 9.1   < > 9.7   Albumin 4.5 4.1  --  4.3   Total Protein 7.5 7.1  --  7.4   Sodium 135 L 138   < > 141   Potassium 3.9 3.9   < > 4.9   CO2 26 29   < > 32 H   Chloride 99 100   < > 102   BUN, Bld 15 23 H   < > 22 H   Alkaline Phosphatase 73 71  --  98 "   ALT 16 13  --  16   AST 28 26  --  30   Total Bilirubin 0.6 0.5  --  0.7    < > = values in this interval not displayed.     URINALYSIS:  Recent Labs   Lab 09/23/19  1138 05/12/20 0919   Color, UA Yellow Yellow   Specific Gravity, UA 1.020 1.025   pH, UA 6.0 7.0   Protein, UA Negative Negative   Bacteria Many A Many A   Nitrite, UA Positive A Positive A   Leukocytes, UA 1+ A Trace A   Urobilinogen, UA Negative Negative   Hyaline Casts, UA 2 A  --       LIPIDS:  Recent Labs   Lab 09/23/19  1137 05/12/20 0919   TSH 1.970 3.020   HDL 54  --    Cholesterol 120  --    Triglycerides 46  --    LDL Cholesterol 56.8 L  --    Hdl/Cholesterol Ratio 45.0  --    Non-HDL Cholesterol 66  --    Total Cholesterol/HDL Ratio 2.2  --      TSH:  Recent Labs   Lab 09/23/19 1137 05/12/20 0919   TSH 1.970 3.020     A1C:        Assessment/Plan     Mauri Matias is a 84 y.o.female with:    1. Abdominal pain, unspecified abdominal location  Seems to be resolving with treatment of depression.  2. Mild recurrent major depression  - citalopram (CELEXA) 20 MG tablet; Take 1 tablet (20 mg total) by mouth once daily.  Dispense: 30 tablet; Refill: 11  Will increase dose and see if improves.    Chronic conditions status updated as per HPI.  Other than changes above, cont current medications and maintain follow up with specialists.  Return to clinic in 1 months.    West Leo MD  [facility]

## 2020-08-30 ENCOUNTER — TELEPHONE (OUTPATIENT)
Dept: FAMILY MEDICINE | Facility: CLINIC | Age: 85
End: 2020-08-30

## 2020-08-31 NOTE — TELEPHONE ENCOUNTER
----- Message from Marlene Jiang sent at 8/29/2020  1:57 PM CDT -----  Contact: patient  Name Of Caller:  Mauri     Provider Name:  Felipa     Does patient feel the need to be seen today?no     Relationship to the Pt?:  Patient     Contact Preference?: [phone]    What is the nature of the call?: Patient called and said she spoke to nurse from your office saying you wont be there on 9/1/20 but you can schedule her on 9/2/20  at 3:45pm      Patient said she spoke Machelle and she would take the 3:45pm   I tried to schedule but it only shows 11:15am and she wants the time you told her at 3:45pm

## 2020-09-02 ENCOUNTER — OFFICE VISIT (OUTPATIENT)
Dept: FAMILY MEDICINE | Facility: CLINIC | Age: 85
End: 2020-09-02
Payer: MEDICARE

## 2020-09-02 VITALS
DIASTOLIC BLOOD PRESSURE: 88 MMHG | HEART RATE: 89 BPM | SYSTOLIC BLOOD PRESSURE: 134 MMHG | WEIGHT: 105.81 LBS | HEIGHT: 62 IN | OXYGEN SATURATION: 97 % | BODY MASS INDEX: 19.47 KG/M2 | TEMPERATURE: 98 F

## 2020-09-02 DIAGNOSIS — R11.0 NAUSEA: ICD-10-CM

## 2020-09-02 DIAGNOSIS — F33.0 MILD RECURRENT MAJOR DEPRESSION: Primary | ICD-10-CM

## 2020-09-02 DIAGNOSIS — R41.3 MEMORY LOSS: ICD-10-CM

## 2020-09-02 PROCEDURE — 99214 OFFICE O/P EST MOD 30 MIN: CPT | Mod: PBBFAC,PN | Performed by: INTERNAL MEDICINE

## 2020-09-02 PROCEDURE — 99214 OFFICE O/P EST MOD 30 MIN: CPT | Mod: S$PBB,,, | Performed by: INTERNAL MEDICINE

## 2020-09-02 PROCEDURE — 99999 PR PBB SHADOW E&M-EST. PATIENT-LVL IV: CPT | Mod: PBBFAC,,, | Performed by: INTERNAL MEDICINE

## 2020-09-02 PROCEDURE — 99214 PR OFFICE/OUTPT VISIT, EST, LEVL IV, 30-39 MIN: ICD-10-PCS | Mod: S$PBB,,, | Performed by: INTERNAL MEDICINE

## 2020-09-02 PROCEDURE — 99999 PR PBB SHADOW E&M-EST. PATIENT-LVL IV: ICD-10-PCS | Mod: PBBFAC,,, | Performed by: INTERNAL MEDICINE

## 2020-09-02 NOTE — PROGRESS NOTES
[facility] Note    Chief Complaint      Chief Complaint   Patient presents with    Follow-up     1 month       History of Present Illness      Mauri Matias is a 84 y.o. female with chronic conditions of CAD, HTN, HLD, HFpEF, depression, GERD who presents today for: follow up complaints of fatigue, nausea, anxiety/depression.  Pt has been taking celexa for the past few months.  Has noticed slightly improved control over her anxiety and increased motivation to participate with maintaining a healthy weight and exercise.  Still complains to family periodically about feeling tired and nauseated.  Pt minimizes her symptoms.  Pt and family report her short term memory has declined but her long term memory is ok.  She notes difficulty with word finding during conversation.    Past Medical History:  Past Medical History:   Diagnosis Date    CAD (coronary atherosclerotic disease)     Hypertension        Past Surgical History:   has a past surgical history that includes Hysterectomy; Tonsillectomy; Coronary stent placement; Left heart catheterization (Left, 12/5/2019); and Coronary angiography (N/A, 12/5/2019).    Family History:  family history includes Heart attack in her father; Hypertension in her mother; Kidney failure in her mother; Parkinsonism in her father.     Social History:  Social History     Tobacco Use    Smoking status: Never Smoker    Smokeless tobacco: Never Used   Substance Use Topics    Alcohol use: No    Drug use: No       I personally reviewed all past medical, surgical, social and family history.    Review of Systems   Constitutional: Negative for chills, fever and malaise/fatigue.   Respiratory: Negative for shortness of breath.    Cardiovascular: Negative for chest pain.   Gastrointestinal: Positive for nausea. Negative for constipation, diarrhea and vomiting.   Skin: Negative for rash.   Neurological: Negative for weakness.   Psychiatric/Behavioral: Positive for  depression and memory loss. The patient is nervous/anxious.    All other systems reviewed and are negative.       Medications:  Outpatient Encounter Medications as of 9/2/2020   Medication Sig Note Dispense Refill    aspirin (ASPIR-81 ORAL) Take by mouth.       citalopram (CELEXA) 20 MG tablet Take 1 tablet (20 mg total) by mouth once daily.  30 tablet 11    clopidogrel (PLAVIX) 75 mg tablet Take 1 tablet (75 mg total) by mouth once daily.  30 tablet 11    levothyroxine (SYNTHROID) 25 MCG tablet Take 25 mcg by mouth once daily.   11    losartan (COZAAR) 50 MG tablet Take by mouth once daily.    11    acetaminophen (TYLENOL) 500 MG tablet Take 500 mg by mouth every 6 (six) hours as needed for Pain. 7/23/2020: As needed      container,empty (NASAL SPRAY BOTTLE MISC) by Misc.(Non-Drug; Combo Route) route.       fish oil-omega-3 fatty acids 300-1,000 mg capsule Take 2 g by mouth once daily.       flash glucose scanning reader (FREESTYLE STAN 14 DAY READER) Misc Use as directed.  Dx hypoglycemia (Patient not taking: Reported on 6/25/2020)  1 each 0    flash glucose sensor (FREESTYLE STAN 14 DAY SENSOR) Kit Use as directed.  Dx hypoglycemia (Patient not taking: Reported on 6/25/2020)  2 kit 3    nitroGLYCERIN (NITROSTAT) 0.4 MG SL tablet TAKE 1 TAB UNDER THE TONGUE EVERY 5 MIN X3 DOSES IF NEEDED FOR CHEST PAIN,IF NO RELIEF GO TO THE ER.   1    ondansetron (ZOFRAN) 4 MG tablet Take 4 mg by mouth every 8 (eight) hours as needed for Nausea.       simethicone (MYLICON) 125 MG chewable tablet Take 125 mg by mouth every 6 (six) hours as needed for Flatulence. 7/23/2020: As needed      tetrahydrozoline/polyethyl gly (EYE DROPS OPHT) Apply to eye.        No facility-administered encounter medications on file as of 9/2/2020.        Allergies:  Review of patient's allergies indicates:   Allergen Reactions    Penicillins Rash       Health Maintenance:    There is no immunization history on file for this patient.  "  Health Maintenance   Topic Date Due    TETANUS VACCINE  11/19/1953    Pneumococcal Vaccine (65+ Low/Medium Risk) (1 of 2 - PCV13) 11/19/2001    DEXA SCAN  12/19/2020    Lipid Panel  09/23/2024        Physical Exam      Vital Signs  Temp: 98.3 °F (36.8 °C)  Temp src: Oral  Pulse: 89  SpO2: 97 %  BP: 134/88  BP Location: Right arm  Patient Position: Sitting  Pain Score: 0-No pain  Height and Weight  Height: 5' 2" (157.5 cm)  Weight: 48 kg (105 lb 13.1 oz)  BSA (Calculated - sq m): 1.45 sq meters  BMI (Calculated): 19.3  Weight in (lb) to have BMI = 25: 136.4]    Physical Exam  Vitals signs reviewed.   Constitutional:       Appearance: She is well-developed.   HENT:      Head: Normocephalic and atraumatic.      Right Ear: External ear normal.      Left Ear: External ear normal.   Eyes:      Conjunctiva/sclera: Conjunctivae normal.      Pupils: Pupils are equal, round, and reactive to light.   Neck:      Vascular: No carotid bruit.   Cardiovascular:      Rate and Rhythm: Normal rate and regular rhythm.      Heart sounds: Normal heart sounds. No murmur.   Pulmonary:      Effort: Pulmonary effort is normal.      Breath sounds: Normal breath sounds. No wheezing or rales.   Abdominal:      General: Bowel sounds are normal. There is no distension.      Palpations: Abdomen is soft.      Tenderness: There is no abdominal tenderness.          Laboratory:  CBC:  Recent Labs   Lab 12/05/19  0426 12/06/19  0557 05/12/20  0919   WBC 7.03 7.27 7.57   RBC 4.34 4.09 5.02   Hemoglobin 13.3 12.4 14.9   Hematocrit 39.6 37.8 46.4   Platelets 186 183 185   Mean Corpuscular Volume 91 92 92   Mean Corpuscular Hemoglobin 30.6 30.3 29.7   Mean Corpuscular Hemoglobin Conc 33.6 32.8 32.1     CMP:  Recent Labs   Lab 09/23/19  1137 12/04/19  1003  05/12/20  0919   Glucose 102 97   < > 105   Calcium 9.3 9.1   < > 9.7   Albumin 4.5 4.1  --  4.3   Total Protein 7.5 7.1  --  7.4   Sodium 135 L 138   < > 141   Potassium 3.9 3.9   < > 4.9   CO2 26 " 29   < > 32 H   Chloride 99 100   < > 102   BUN, Bld 15 23 H   < > 22 H   Alkaline Phosphatase 73 71  --  98   ALT 16 13  --  16   AST 28 26  --  30   Total Bilirubin 0.6 0.5  --  0.7    < > = values in this interval not displayed.     URINALYSIS:  Recent Labs   Lab 09/23/19  1138 05/12/20 0919   Color, UA Yellow Yellow   Specific Gravity, UA 1.020 1.025   pH, UA 6.0 7.0   Protein, UA Negative Negative   Bacteria Many A Many A   Nitrite, UA Positive A Positive A   Leukocytes, UA 1+ A Trace A   Urobilinogen, UA Negative Negative   Hyaline Casts, UA 2 A  --       LIPIDS:  Recent Labs   Lab 09/23/19  1137 05/12/20  0919   TSH 1.970 3.020   HDL 54  --    Cholesterol 120  --    Triglycerides 46  --    LDL Cholesterol 56.8 L  --    Hdl/Cholesterol Ratio 45.0  --    Non-HDL Cholesterol 66  --    Total Cholesterol/HDL Ratio 2.2  --      TSH:  Recent Labs   Lab 09/23/19  1137 05/12/20 0919   TSH 1.970 3.020     A1C:        Assessment/Plan     Mauri Matias is a 84 y.o.female with:    1. Mild recurrent major depression  Discussed switching celexa to prozac for more activating effects.  Pt wants to avoid any changes currently and will call back if interested in changing.  2. Nausea  Pt reports long standing tendency towards nausea.  Has zofran to use as needed.  3. Memory loss  Discussed seeing neurology specialist to further evaluate and discuss treatment options.  Pt hesitant to proceed but will consider.    Chronic conditions status updated as per HPI.  Other than changes above, cont current medications and maintain follow up with specialists.  Return to clinic in 1-3 months.    West Leo MD  [facility]

## 2020-10-01 ENCOUNTER — PATIENT MESSAGE (OUTPATIENT)
Dept: OTHER | Facility: OTHER | Age: 85
End: 2020-10-01

## 2020-11-02 ENCOUNTER — TELEPHONE (OUTPATIENT)
Dept: PRIMARY CARE CLINIC | Facility: CLINIC | Age: 85
End: 2020-11-02

## 2020-11-02 NOTE — TELEPHONE ENCOUNTER
----- Message from Analilia Leblanc sent at 11/2/2020  2:50 PM CST -----  Contact: [phone]  Patient would like to speak with you about wanting to switch her depression medication Please advise

## 2020-11-02 NOTE — TELEPHONE ENCOUNTER
I would love to discuss adjusting her depression medicine.  Can she make an appt to discuss in person?

## 2020-11-03 ENCOUNTER — TELEPHONE (OUTPATIENT)
Dept: FAMILY MEDICINE | Facility: CLINIC | Age: 85
End: 2020-11-03

## 2020-11-03 NOTE — TELEPHONE ENCOUNTER
----- Message from Elvia Mata sent at 11/3/2020 12:52 PM CST -----  Type:  Patient Returning Call    Who Called: Son  Who Left Message for Patient:offic  Does the patient know what this is regarding?:   Would the patient rather a call back or a response via [employer] [employer]?    Best Call Back Number: [phone]  Additional Information:

## 2020-11-06 ENCOUNTER — TELEPHONE (OUTPATIENT)
Dept: FAMILY MEDICINE | Facility: CLINIC | Age: 85
End: 2020-11-06

## 2020-11-06 NOTE — TELEPHONE ENCOUNTER
----- Message from Sangeeta Maciel sent at 11/6/2020  8:26 AM CST -----  Contact: [phone]/ Son Hank  Patient's son is returning a call to office. Mr. Mckinney prefers to speak with Dr. Leo only. Please call and advise.

## 2020-11-06 NOTE — TELEPHONE ENCOUNTER
----- Message from Jaqueline Santillan sent at 11/6/2020  8:06 AM CST -----  Contact: [phone]/ Son Hank  Who Called: Pt's son Hank   Regarding: questions about starting new rx  Would the patient rather a call back or a response via [facility]? Call back  Best Call Back Number: [phone]  Additional Information:

## 2020-11-11 ENCOUNTER — OFFICE VISIT (OUTPATIENT)
Dept: FAMILY MEDICINE | Facility: CLINIC | Age: 85
End: 2020-11-11
Payer: MEDICARE

## 2020-11-11 VITALS
BODY MASS INDEX: 18.15 KG/M2 | TEMPERATURE: 97 F | WEIGHT: 98.63 LBS | SYSTOLIC BLOOD PRESSURE: 140 MMHG | DIASTOLIC BLOOD PRESSURE: 98 MMHG | HEART RATE: 81 BPM | OXYGEN SATURATION: 94 % | HEIGHT: 62 IN

## 2020-11-11 DIAGNOSIS — F33.0 MILD RECURRENT MAJOR DEPRESSION: ICD-10-CM

## 2020-11-11 PROCEDURE — 99999 PR PBB SHADOW E&M-EST. PATIENT-LVL IV: CPT | Mod: PBBFAC,,, | Performed by: INTERNAL MEDICINE

## 2020-11-11 PROCEDURE — 99213 OFFICE O/P EST LOW 20 MIN: CPT | Mod: S$PBB,,, | Performed by: INTERNAL MEDICINE

## 2020-11-11 PROCEDURE — 99999 PR PBB SHADOW E&M-EST. PATIENT-LVL IV: ICD-10-PCS | Mod: PBBFAC,,, | Performed by: INTERNAL MEDICINE

## 2020-11-11 PROCEDURE — 99214 OFFICE O/P EST MOD 30 MIN: CPT | Mod: PBBFAC,PN | Performed by: INTERNAL MEDICINE

## 2020-11-11 PROCEDURE — 99213 PR OFFICE/OUTPT VISIT, EST, LEVL III, 20-29 MIN: ICD-10-PCS | Mod: S$PBB,,, | Performed by: INTERNAL MEDICINE

## 2020-11-11 RX ORDER — LOSARTAN POTASSIUM 25 MG/1
25 TABLET ORAL 2 TIMES DAILY
Status: ON HOLD | COMMUNITY
Start: 2020-10-16 | End: 2022-08-01 | Stop reason: SDUPTHER

## 2020-11-11 RX ORDER — CITALOPRAM 40 MG/1
40 TABLET, FILM COATED ORAL DAILY
Qty: 90 TABLET | Refills: 3 | Status: SHIPPED | OUTPATIENT
Start: 2020-11-11 | End: 2021-10-03

## 2020-11-11 NOTE — PROGRESS NOTES
[facility] Note    Chief Complaint      Chief Complaint   Patient presents with    Depression       History of Present Illness      Mauri Matias is a 84 y.o. female with chronic conditions of CAD, HTN, HLD, HFpEF, depression, GERD who presents today for: depression follow up.  Family has noticed improvement in her mood.  She still has not gained significant weight and fmaily reports she does not eat full portions or often.    Pt had a fall on wet concrete in 9/2020, slipped backwards onto her pelvis and continued to hit posterior head on concrete.  ER visit showed no fractures or hematoma on CT head, neck, lumbar spine and pelvis.      Past Medical History:  Past Medical History:   Diagnosis Date    CAD (coronary atherosclerotic disease)     Hypertension        Past Surgical History:   has a past surgical history that includes Hysterectomy; Tonsillectomy; Coronary stent placement; Left heart catheterization (Left, 12/5/2019); and Coronary angiography (N/A, 12/5/2019).    Family History:  family history includes Heart attack in her father; Hypertension in her mother; Kidney failure in her mother; Parkinsonism in her father.     Social History:  Social History     Tobacco Use    Smoking status: Never Smoker    Smokeless tobacco: Never Used   Substance Use Topics    Alcohol use: No    Drug use: No       I personally reviewed all past medical, surgical, social and family history.    Review of Systems   Constitutional: Negative for chills, fever and malaise/fatigue.   Respiratory: Negative for shortness of breath.    Cardiovascular: Negative for chest pain.   Gastrointestinal: Negative for constipation, diarrhea, nausea and vomiting.   Skin: Negative for rash.   Neurological: Negative for weakness.   All other systems reviewed and are negative.       Medications:  Outpatient Encounter Medications as of 11/11/2020   Medication Sig Note Dispense Refill    aspirin (ASPIR-81 ORAL) Take by  mouth.       citalopram (CELEXA) 40 MG tablet Take 1 tablet (40 mg total) by mouth once daily.  90 tablet 3    clopidogrel (PLAVIX) 75 mg tablet Take 1 tablet (75 mg total) by mouth once daily.  30 tablet 11    container,empty (NASAL SPRAY BOTTLE MISC) by Misc.(Non-Drug; Combo Route) route.       fish oil-omega-3 fatty acids 300-1,000 mg capsule Take 2 g by mouth once daily.       flash glucose scanning reader (FREESTYLE STAN 14 DAY READER) Misc Use as directed.  Dx hypoglycemia  1 each 0    levothyroxine (SYNTHROID) 25 MCG tablet Take 25 mcg by mouth once daily.   11    losartan (COZAAR) 25 MG tablet        nitroGLYCERIN (NITROSTAT) 0.4 MG SL tablet TAKE 1 TAB UNDER THE TONGUE EVERY 5 MIN X3 DOSES IF NEEDED FOR CHEST PAIN,IF NO RELIEF GO TO THE ER.   1    ondansetron (ZOFRAN) 4 MG tablet Take 4 mg by mouth every 8 (eight) hours as needed for Nausea.       simethicone (MYLICON) 125 MG chewable tablet Take 125 mg by mouth every 6 (six) hours as needed for Flatulence. 7/23/2020: As needed      tetrahydrozoline/polyethyl gly (EYE DROPS OPHT) Apply to eye.       [DISCONTINUED] citalopram (CELEXA) 20 MG tablet Take 1 tablet (20 mg total) by mouth once daily.  30 tablet 11    acetaminophen (TYLENOL) 500 MG tablet Take 500 mg by mouth every 6 (six) hours as needed for Pain. 7/23/2020: As needed      flash glucose sensor (FREESTYLE STAN 14 DAY SENSOR) Kit Use as directed.  Dx hypoglycemia  2 kit 3    losartan (COZAAR) 50 MG tablet Take 25 mg by mouth 2 (two) times a day.    11    ondansetron (ZOFRAN-ODT) 4 MG TbDL Take 1 tablet (4 mg total) by mouth every 8 (eight) hours as needed. (Patient not taking: Reported on 11/11/2020)  10 tablet 0     No facility-administered encounter medications on file as of 11/11/2020.        Allergies:  Review of patient's allergies indicates:   Allergen Reactions    Penicillins Rash       Health Maintenance:    There is no immunization history on file for this patient.  "  Health Maintenance   Topic Date Due    TETANUS VACCINE  11/19/1953    Pneumococcal Vaccine (65+ Low/Medium Risk) (1 of 2 - PCV13) 11/19/2001    DEXA SCAN  12/19/2020    Lipid Panel  09/23/2024        Physical Exam      Vital Signs  Temp: 97.1 °F (36.2 °C)  Temp src: Oral  Pulse: 81  SpO2: (!) 94 %  BP: (!) 140/98  BP Location: Right arm  Patient Position: Sitting  Height and Weight  Height: 5' 2" (157.5 cm)  Weight: 44.7 kg (98 lb 10.5 oz)  BSA (Calculated - sq m): 1.4 sq meters  BMI (Calculated): 18  Weight in (lb) to have BMI = 25: 136.4]    Physical Exam  Vitals signs reviewed.   Constitutional:       Appearance: She is well-developed.   HENT:      Head: Normocephalic and atraumatic.      Right Ear: External ear normal.      Left Ear: External ear normal.   Cardiovascular:      Rate and Rhythm: Normal rate and regular rhythm.      Heart sounds: Normal heart sounds. No murmur.   Pulmonary:      Effort: Pulmonary effort is normal.      Breath sounds: Normal breath sounds. No wheezing or rales.   Abdominal:      General: Bowel sounds are normal. There is no distension.      Palpations: Abdomen is soft.      Tenderness: There is no abdominal tenderness.          Laboratory:  CBC:  Recent Labs   Lab 12/05/19  0426 12/06/19  0557 05/12/20  0919   WBC 7.03 7.27 7.57   RBC 4.34 4.09 5.02   Hemoglobin 13.3 12.4 14.9   Hematocrit 39.6 37.8 46.4   Platelets 186 183 185   MCV 91 92 92   MCH 30.6 30.3 29.7   MCHC 33.6 32.8 32.1     CMP:  Recent Labs   Lab 09/23/19  1137 12/04/19  1003  05/12/20  0919   Glucose 102 97   < > 105   Calcium 9.3 9.1   < > 9.7   Albumin 4.5 4.1  --  4.3   Total Protein 7.5 7.1  --  7.4   Sodium 135 L 138   < > 141   Potassium 3.9 3.9   < > 4.9   CO2 26 29   < > 32 H   Chloride 99 100   < > 102   BUN 15 23 H   < > 22 H   Alkaline Phosphatase 73 71  --  98   ALT 16 13  --  16   AST 28 26  --  30   Total Bilirubin 0.6 0.5  --  0.7    < > = values in this interval not displayed. "     URINALYSIS:  Recent Labs   Lab 09/23/19  1138 05/12/20 0919   Color, UA Yellow Yellow   Specific Gravity, UA 1.020 1.025   pH, UA 6.0 7.0   Protein, UA Negative Negative   Bacteria Many A Many A   Nitrite, UA Positive A Positive A   Leukocytes, UA 1+ A Trace A   Urobilinogen, UA Negative Negative   Hyaline Casts, UA 2 A  --       LIPIDS:  Recent Labs   Lab 09/23/19  1137 05/12/20 0919   TSH 1.970 3.020   HDL 54  --    Cholesterol 120  --    Triglycerides 46  --    LDL Cholesterol 56.8 L  --    HDL/Cholesterol Ratio 45.0  --    Non-HDL Cholesterol 66  --    Total Cholesterol/HDL Ratio 2.2  --      TSH:  Recent Labs   Lab 09/23/19  1137 05/12/20 0919   TSH 1.970 3.020     A1C:        Assessment/Plan     Mauri Matias is a 84 y.o.female with:    1. Mild recurrent major depression  - citalopram (CELEXA) 40 MG tablet; Take 1 tablet (40 mg total) by mouth once daily.  Dispense: 90 tablet; Refill: 3  Increasing celexa to 40 mg.       Chronic conditions status updated as per HPI.  Other than changes above, cont current medications and maintain follow up with specialists.  Return to clinic in 1-3 months.    West Leo MD  [facility]

## 2020-12-22 ENCOUNTER — NURSE TRIAGE (OUTPATIENT)
Dept: ADMINISTRATIVE | Facility: CLINIC | Age: 85
End: 2020-12-22

## 2020-12-22 NOTE — TELEPHONE ENCOUNTER
Pt aware, At this time we do not have the vaccines and we do not have an expected date. I would check back in the beginning of the year. Thank you

## 2020-12-22 NOTE — TELEPHONE ENCOUNTER
[facility] RN  Patient Called in reference to covid.  Calling wanting information about the vaccine. Reading in paper for who is next for the vaccine.  Wants Dr. Leo.  To know that they would like the vaccine.      Reason for Disposition   Information only question and nurse able to answer    Protocols used: INFORMATION ONLY CALL - NO TRIAGE-A-[place]

## 2020-12-31 ENCOUNTER — TELEPHONE (OUTPATIENT)
Dept: FAMILY MEDICINE | Facility: CLINIC | Age: 85
End: 2020-12-31

## 2020-12-31 NOTE — TELEPHONE ENCOUNTER
Spoke with pt son. Informed him that we do not have the vaccine at our office currently and we don't have any information on when the vaccine will be available to the patients as of now. Informed son to be on the look out for announcements from [facility] in the future regarding the vaccine. Son verbalized understanding.

## 2020-12-31 NOTE — TELEPHONE ENCOUNTER
----- Message from Claudia Joseph sent at 12/31/2020  1:58 PM CST -----  Regarding: Requesting a callback  Contact: Hank Murry:  Needs Medical Advice    Who Called: Pt son  Would the patient rather a call back or a response via [facility]? Callback   Best Call Back Number: [phone]  Additional Information: Needing to know if office has an access to vaccine or is that something pt need to handle on own

## 2021-03-31 ENCOUNTER — EXTERNAL CHRONIC CARE MANAGEMENT (OUTPATIENT)
Dept: PRIMARY CARE CLINIC | Facility: CLINIC | Age: 86
End: 2021-03-31
Payer: MEDICARE

## 2021-03-31 PROCEDURE — 99490 PR CHRONIC CARE MGMT, 1ST 20 MIN: ICD-10-PCS | Mod: S$PBB,,, | Performed by: INTERNAL MEDICINE

## 2021-03-31 PROCEDURE — 99490 CHRNC CARE MGMT STAFF 1ST 20: CPT | Mod: PBBFAC,PO | Performed by: INTERNAL MEDICINE

## 2021-03-31 PROCEDURE — 99490 CHRNC CARE MGMT STAFF 1ST 20: CPT | Mod: S$PBB,,, | Performed by: INTERNAL MEDICINE

## 2021-04-30 ENCOUNTER — EXTERNAL CHRONIC CARE MANAGEMENT (OUTPATIENT)
Dept: PRIMARY CARE CLINIC | Facility: CLINIC | Age: 86
End: 2021-04-30
Payer: MEDICARE

## 2021-04-30 PROCEDURE — 99490 PR CHRONIC CARE MGMT, 1ST 20 MIN: ICD-10-PCS | Mod: S$PBB,,, | Performed by: INTERNAL MEDICINE

## 2021-04-30 PROCEDURE — 99490 CHRNC CARE MGMT STAFF 1ST 20: CPT | Mod: S$PBB,,, | Performed by: INTERNAL MEDICINE

## 2021-04-30 PROCEDURE — 99490 CHRNC CARE MGMT STAFF 1ST 20: CPT | Mod: PBBFAC,PO | Performed by: INTERNAL MEDICINE

## 2021-05-25 ENCOUNTER — PES CALL (OUTPATIENT)
Dept: ADMINISTRATIVE | Facility: CLINIC | Age: 86
End: 2021-05-25

## 2021-05-31 ENCOUNTER — EXTERNAL CHRONIC CARE MANAGEMENT (OUTPATIENT)
Dept: PRIMARY CARE CLINIC | Facility: CLINIC | Age: 86
End: 2021-05-31
Payer: MEDICARE

## 2021-05-31 PROCEDURE — 99490 CHRNC CARE MGMT STAFF 1ST 20: CPT | Mod: PBBFAC,PO | Performed by: INTERNAL MEDICINE

## 2021-05-31 PROCEDURE — 99490 CHRNC CARE MGMT STAFF 1ST 20: CPT | Mod: S$PBB,,, | Performed by: INTERNAL MEDICINE

## 2021-05-31 PROCEDURE — 99490 PR CHRONIC CARE MGMT, 1ST 20 MIN: ICD-10-PCS | Mod: S$PBB,,, | Performed by: INTERNAL MEDICINE

## 2021-06-30 ENCOUNTER — EXTERNAL CHRONIC CARE MANAGEMENT (OUTPATIENT)
Dept: PRIMARY CARE CLINIC | Facility: CLINIC | Age: 86
End: 2021-06-30
Payer: MEDICARE

## 2021-06-30 PROCEDURE — 99490 PR CHRONIC CARE MGMT, 1ST 20 MIN: ICD-10-PCS | Mod: S$PBB,,, | Performed by: INTERNAL MEDICINE

## 2021-06-30 PROCEDURE — 99490 CHRNC CARE MGMT STAFF 1ST 20: CPT | Mod: PBBFAC,PO | Performed by: INTERNAL MEDICINE

## 2021-06-30 PROCEDURE — 99490 CHRNC CARE MGMT STAFF 1ST 20: CPT | Mod: S$PBB,,, | Performed by: INTERNAL MEDICINE

## 2021-09-28 ENCOUNTER — PES CALL (OUTPATIENT)
Dept: ADMINISTRATIVE | Facility: CLINIC | Age: 86
End: 2021-09-28

## 2021-10-01 DIAGNOSIS — F33.0 MILD RECURRENT MAJOR DEPRESSION: ICD-10-CM

## 2021-10-03 RX ORDER — CITALOPRAM 40 MG/1
TABLET, FILM COATED ORAL
Qty: 90 TABLET | Refills: 0 | Status: SHIPPED | OUTPATIENT
Start: 2021-10-03 | End: 2022-01-03

## 2021-10-12 ENCOUNTER — TELEPHONE (OUTPATIENT)
Dept: FAMILY MEDICINE | Facility: CLINIC | Age: 86
End: 2021-10-12

## 2021-10-15 ENCOUNTER — TELEPHONE (OUTPATIENT)
Dept: FAMILY MEDICINE | Facility: CLINIC | Age: 86
End: 2021-10-15

## 2022-01-10 ENCOUNTER — PATIENT MESSAGE (OUTPATIENT)
Dept: ADMINISTRATIVE | Facility: HOSPITAL | Age: 87
End: 2022-01-10
Payer: MEDICARE

## 2022-01-15 DIAGNOSIS — F33.0 MILD RECURRENT MAJOR DEPRESSION: ICD-10-CM

## 2022-01-15 NOTE — TELEPHONE ENCOUNTER
No new care gaps identified.  Powered by [employer]. Reference number: [id number].   1/15/2022 1:40:58 PM CST

## 2022-01-21 DIAGNOSIS — F33.0 MILD RECURRENT MAJOR DEPRESSION: ICD-10-CM

## 2022-01-21 RX ORDER — CITALOPRAM 40 MG/1
40 TABLET, FILM COATED ORAL DAILY
Qty: 90 TABLET | Refills: 3 | Status: SHIPPED | OUTPATIENT
Start: 2022-01-21 | End: 2022-03-29

## 2022-01-21 NOTE — TELEPHONE ENCOUNTER
----- Message from Liz Marquis sent at 1/21/2022 11:41 AM CST -----  Regarding: call back  Contact: Hank [phone]  Who Called: Hank     Type:  RX Refill Request    Who Called: PT  Refill or New Rx: Refills   RX Name and Strength: citalopram (CELEXA) 40 MG tablet 90 tablet   How is the patient currently taking it? (ex. 1XDay): 1 x day   Is this a 30 day or 90 day RX: 90   Preferred Pharmacy with phone number: [facility] [place], [place] - [address]   Phone:  [phone]  Fax:  [phone]           Contrast: Isovue. Contrast concentration: 300. Amount: 150 mL.

## 2022-01-21 NOTE — TELEPHONE ENCOUNTER
No new care gaps identified.  Powered by [employer]. Reference number: [id number].   1/21/2022 11:48:29 AM CST

## 2022-01-28 RX ORDER — CITALOPRAM 40 MG/1
TABLET, FILM COATED ORAL
Qty: 90 TABLET | Refills: 0 | OUTPATIENT
Start: 2022-01-28

## 2022-01-28 NOTE — TELEPHONE ENCOUNTER
Quick DC. Request already responded to by other means (e.g. phone or fax)   Refill Authorization Note   Mauri Florencio  is requesting a refill authorization.  Brief Assessment and Rationale for Refill:  Approve  Medication Therapy Plan:       Medication Reconciliation Completed:  No      Comments:   Pended Medication(s)       Requested Prescriptions     Pending Prescriptions Disp Refills    citalopram (CELEXA) 40 MG tablet [Pharmacy Med Name: CITALOPRAM HYDROBROMIDE 40MG TABS] 90 tablet 0     Sig: TAKE ONE TABLET BY MOUTH EVERY DAY        Duplicate Pended Encounter(s)/ Last Prescribed Details: (includes pharmacy & prescriber details)   Ordering Encounter Report    Associated Reports   View Encounter          Note composed:10:44 AM 01/28/2022

## 2022-02-14 ENCOUNTER — PATIENT OUTREACH (OUTPATIENT)
Dept: ADMINISTRATIVE | Facility: HOSPITAL | Age: 87
End: 2022-02-14
Payer: MEDICARE

## 2022-03-17 ENCOUNTER — PES CALL (OUTPATIENT)
Dept: ADMINISTRATIVE | Facility: CLINIC | Age: 87
End: 2022-03-17
Payer: MEDICARE

## 2022-03-29 DIAGNOSIS — F33.0 MILD RECURRENT MAJOR DEPRESSION: ICD-10-CM

## 2022-03-29 RX ORDER — CITALOPRAM 40 MG/1
TABLET, FILM COATED ORAL
Qty: 90 TABLET | Refills: 3 | Status: ON HOLD | OUTPATIENT
Start: 2022-03-29 | End: 2022-08-01 | Stop reason: SDUPTHER

## 2022-03-29 NOTE — TELEPHONE ENCOUNTER
This Rx Request does not qualify for assessment with the OR   Please review protocol details and the Care Due Message for extra clinical information    Reasons Rx Request may be deferred:  Patient has been seen in the ED/Hospital since the last PCP visit  Pt due for OV with PCP    Note composed:1:31 PM 03/29/2022

## 2022-03-29 NOTE — TELEPHONE ENCOUNTER
Care Due:                  Date            Visit Type   Department     Provider  --------------------------------------------------------------------------------                                ESTABLISHED                              PATIENT      DESC FAMILY  Last Visit: 11-      [facility]  [place]  Next Visit: None Scheduled  None         None Found                                                            Last  Test          Frequency    Reason                     Performed    Due Date  --------------------------------------------------------------------------------    Office Visit  12 months..  citalopram...............  11- 11-    Powered by [employer]. Reference number: [id number].   3/29/2022 7:05:18 AM CDT

## 2022-07-06 ENCOUNTER — TELEPHONE (OUTPATIENT)
Dept: ADMINISTRATIVE | Facility: HOSPITAL | Age: 87
End: 2022-07-06
Payer: MEDICARE

## 2022-07-06 ENCOUNTER — PATIENT MESSAGE (OUTPATIENT)
Dept: ADMINISTRATIVE | Facility: HOSPITAL | Age: 87
End: 2022-07-06
Payer: MEDICARE

## 2022-07-27 ENCOUNTER — TELEPHONE (OUTPATIENT)
Dept: FAMILY MEDICINE | Facility: CLINIC | Age: 87
End: 2022-07-27
Payer: MEDICARE

## 2022-07-27 NOTE — TELEPHONE ENCOUNTER
----- Message from Elvia Mata sent at 7/27/2022 12:31 PM CDT -----  Type:  Needs Medical Advice    Who Called:  pt son Hank   Symptoms (please be specific):  est care / weight loss concern     Would the patient rather a call back or a response via [facility]?  Call   Best Call Back Number:  [phone]  Additional Information:

## 2022-07-28 PROBLEM — G31.83 LEWY BODY DEMENTIA: Status: ACTIVE | Noted: 2022-07-28

## 2022-07-28 PROBLEM — N39.0 UTI (URINARY TRACT INFECTION): Status: ACTIVE | Noted: 2022-07-28

## 2022-07-28 PROBLEM — F02.80 LEWY BODY DEMENTIA: Status: ACTIVE | Noted: 2022-07-28

## 2022-07-28 PROBLEM — R11.0 NAUSEA: Status: RESOLVED | Noted: 2020-09-02 | Resolved: 2022-07-28

## 2022-07-29 ENCOUNTER — TELEPHONE (OUTPATIENT)
Dept: FAMILY MEDICINE | Facility: CLINIC | Age: 87
End: 2022-07-29
Payer: MEDICARE

## 2022-07-29 PROBLEM — R63.8 INADEQUATE ORAL INTAKE: Status: ACTIVE | Noted: 2022-07-29

## 2022-07-29 NOTE — TELEPHONE ENCOUNTER
Spoke with patient son is who is also a Dr - patient is currently in the hospital and she will need to have a Dr to follow up with - patient son will for his mom to be establish care with you. Please advised.

## 2022-07-29 NOTE — TELEPHONE ENCOUNTER
----- Message from Korey Peña sent at 7/29/2022  2:37 PM CDT -----  Regarding: missed call  Type:  Patient Returning Call    Who Called:patient son     Who Left Message for Patient: office    Does the patient know what this is regarding?:missed call     Would the patient rather a call back or a response via [facility]? Call back     Best Call Back Number:[phone]  Additional Information: n/a

## 2022-07-30 PROBLEM — E21.3 HYPERPARATHYROIDISM: Status: ACTIVE | Noted: 2022-07-30

## 2022-08-02 ENCOUNTER — PES CALL (OUTPATIENT)
Dept: ADMINISTRATIVE | Facility: CLINIC | Age: 87
End: 2022-08-02
Payer: MEDICARE

## 2022-08-02 PROCEDURE — G0180 PR HOME HEALTH MD CERTIFICATION: ICD-10-PCS | Mod: ,,, | Performed by: INTERNAL MEDICINE

## 2022-08-02 PROCEDURE — G0180 MD CERTIFICATION HHA PATIENT: HCPCS | Mod: ,,, | Performed by: INTERNAL MEDICINE

## 2022-08-03 ENCOUNTER — PATIENT OUTREACH (OUTPATIENT)
Dept: ADMINISTRATIVE | Facility: CLINIC | Age: 87
End: 2022-08-03
Payer: MEDICARE

## 2022-08-03 DIAGNOSIS — N39.0 URINARY TRACT INFECTION WITHOUT HEMATURIA, SITE UNSPECIFIED: Primary | ICD-10-CM

## 2022-08-03 DIAGNOSIS — F02.818 LEWY BODY DEMENTIA WITH BEHAVIORAL DISTURBANCE: ICD-10-CM

## 2022-08-03 DIAGNOSIS — G31.83 LEWY BODY DEMENTIA WITH BEHAVIORAL DISTURBANCE: ICD-10-CM

## 2022-08-03 NOTE — PROGRESS NOTES
C3 nurse attempted to contact Mauri Matias  for a TCC post hospital discharge follow up call. The patient is unable to conduct the call @ this time. The patient's son requested a callback.    The patient has a scheduled [facility] appointment with KAYLEN Bui on 08/17/2022 @ 3000. Message sent to Physician staff, appointment past 5-7 days discharge date.

## 2022-08-04 NOTE — TELEPHONE ENCOUNTER
lvm to speak with daughter regarding scheduling a sooner available hospital f/u / est care visit.

## 2022-08-04 NOTE — PROGRESS NOTES
C3 nurse spoke with Mauri Matias (daughter) for a TCC post hospital discharge follow up call. The patient has a scheduled HOSFU appointment with KAYLEN Bui on 08/17/2022 @ 1120.  Message routed to PCP that Hosp f/u past recommended 5-7 days of discharge date 08/01/2022. NP referral placed.

## 2022-08-05 ENCOUNTER — TELEPHONE (OUTPATIENT)
Dept: FAMILY MEDICINE | Facility: CLINIC | Age: 87
End: 2022-08-05
Payer: MEDICARE

## 2022-08-05 ENCOUNTER — PES CALL (OUTPATIENT)
Dept: ADMINISTRATIVE | Facility: CLINIC | Age: 87
End: 2022-08-05
Payer: MEDICARE

## 2022-08-05 NOTE — TELEPHONE ENCOUNTER
----- Message from Kira Nguyen sent at 8/5/2022 11:40 AM CDT -----  Contact: Ute(Daughter)-[phone]  Type:  Patient Returning Call    Who Called:Pt's daughter  Who Left Message for Patient:Unknown  Does the patient know what this is regarding?:Hospital F/U  Would the patient rather a call back or a response via [facility]?  Call back  Best Call Back Number:[phone]

## 2022-08-05 NOTE — TELEPHONE ENCOUNTER
Called and spoke with pt in regards of patient. Pt's daughter informed me that she was returning a call that she got from us for an earlier appt. Informed pt's daughter that pt has an appt on 8/17/2022. Pt verbalized understanding of message.

## 2022-08-12 ENCOUNTER — TELEPHONE (OUTPATIENT)
Dept: FAMILY MEDICINE | Facility: CLINIC | Age: 87
End: 2022-08-12
Payer: MEDICARE

## 2022-08-12 NOTE — TELEPHONE ENCOUNTER
----- Message from Joe Quiles sent at 8/12/2022  1:18 PM CDT -----  Type:  Needs Medical Advice    Who Called: self  Reason:regarding HH that was supposed to come out     Best Call Back Number: [phone]  Additional Information: none

## 2022-08-12 NOTE — TELEPHONE ENCOUNTER
Spoke with pt's son in regards of message on pt. He was calling to see if NP Liz Hidalgo works close with Dr. Bui. Informed me that they dont, but whatever NÉSTOR Hill does Dr. Bui can see in the [facility] system what is going on. He verbalized understanding of message. He informed me that he would call back once she spoke with his parents to see what they wanted to do in regards pf pt.

## 2022-08-17 ENCOUNTER — CARE AT HOME (OUTPATIENT)
Dept: HOME HEALTH SERVICES | Facility: CLINIC | Age: 87
End: 2022-08-17
Payer: MEDICARE

## 2022-08-17 DIAGNOSIS — G31.83 LEWY BODY DEMENTIA WITHOUT BEHAVIORAL DISTURBANCE: ICD-10-CM

## 2022-08-17 DIAGNOSIS — E87.1 HYPONATREMIA: ICD-10-CM

## 2022-08-17 DIAGNOSIS — I11.9 HYPERTENSIVE HEART DISEASE WITHOUT HEART FAILURE: ICD-10-CM

## 2022-08-17 DIAGNOSIS — F02.80 LEWY BODY DEMENTIA WITHOUT BEHAVIORAL DISTURBANCE: ICD-10-CM

## 2022-08-17 PROCEDURE — 99350 HOME/RES VST EST HIGH MDM 60: CPT | Mod: S$GLB,,, | Performed by: NURSE PRACTITIONER

## 2022-08-17 PROCEDURE — 99350 PR HOME VISIT,ESTAB PATIENT,LEVEL IV: ICD-10-PCS | Mod: S$GLB,,, | Performed by: NURSE PRACTITIONER

## 2022-08-18 NOTE — ASSESSMENT & PLAN NOTE
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

## 2022-08-18 NOTE — PROGRESS NOTES
[facility]  Transition of Care Home Visit    Visit Date: 8/18/2022  Encounter Provider: Liz Lange   PCP:  West Leo MD    PRESENTING HISTORY      Patient ID: Mauri Matias is a 86 y.o. female.    Consult Requested By:  Dr. Pallavi Sunkara  Reason for Consult:  Hospital Follow Up.    Mauri is being seen at home due to being seen at home due to physical debility that presents a taxing effort to leave the home, to mitigate high risk of hospital readmission and/or due to the limited availability of reliable or safe options for transportation to the point of access to the provider. Prior to treatment on this visit the chart was reviewed and patient verbal consent was obtained.      Chief Complaint: No chief complaint on file.        History of Present Illness: Ms. Mauri Matias is a 86 y.o. female who was recently admitted to the hospital.  Admit 7/27/22  Discharge 8/1/22  HPI:   The patient is an 85 y/o female with PMH of Lewy body dementia, CAD, and HTN. History is provided by patient's son who was present at bedside at the time of exam. She presents with AMS. She was seen in the ER on 7/24 s/p fall. Work up showed a UTI and she was dc'd on cipro. She has been feeling a bit weaker than prior. No reports of fevers, chills, nausea, vomiting and negative covid test per her son who is a physician.         * No surgery found *       Hospital Course:   7/28 : Was started on IVF , but no improvement in Na level. SIADH is most likely reason for hyponatremia now. Celexa therefore being tapered down and Nacl tabs started. Mild improvement on repeat level. Antibiotics for known UTI changed to Macrobid sec to risk of QTc prolongation when used with Celexa. No improvement in mentation of patient as per family at bedside. PT eval done and H.H advised.      7/29 : Na level improving slowly. Having poor oral intake still. No improvement in mentation. Continuing to wean Celexa(over 2 weeks) and  Rivastigmine d/reji as has minor chance if hyponatremia with this too. Small dose Remeron started. Nephrology consulted.      7/30 : Na improved rapidly from last night to this a.m. therefore Nacl tabs d/reji and started on Dextrose drip and Na level decreased to 129 again. Drip therefore d/reji. Mild improvement in appetite this a.m, but was drowsy most of the day from noon.      7/31 : Na level improving with no further intervention. More alert and awake today and oral intake improving slowly. More drowsy overnight and so Remeron dose decreased to 7.5 mg.     8/1 : Patient started on Nacl tabs again last evening and Na improving slowly. Cont to have good oral intake. She is now being d/reji home in a stable condition with H.H. cont to taper off Celexa and cont Remeron after d/c. Nephrology will f/u closely for hyponatremia. Losartan dose increased as blood pressure increasing.   ___________________________________________________________________    Today:    HPI:  Pt is being seen in her home today for hospital follow up and to establish home based care.  She is found in her home with her  and paid caregiver present, she is eating lunch in her kitchen.  She is AAOx1, pleasant and follows commands.  She was recently admitted for hyponatremia, see hospital course.    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    She was admitted for hyponatremia, she was discharged on NaCl tables and will have weekly labs x4.  She is being followed by nephrology Dr Mendoza and her Celexa is being weaned. She also had a up trend in her b/p and Losartan was increased to 50mg at discharge. Her son reports her b/p is fluctuating. Reading of 200/100 last night, not sure if b/p machine is accurate. Family has held NaCl tabs this am  due to high b/p yesterday, family also gave her one of her spouse's HCTZ last nite due to elevated reading. This morning her b/p is 128/70, she took only her Losartan this am, no salt tabs.  Pt is mildly orthostatic, decrease to 96/60 after standing, encouraged to increase fluids today.   Weekly BMP is due tomorrow, home health will collect and monitor b/p    Dementia:   is concerned over pt's intake. He reports he has been asking for quite some time for appetite stimulant. Pt was started on Remeron in hospital to increase appetite, stopped due to sedation.  Discussed at length with spouse. He wants to restart Remeron. Explained the side effects are severe with this medications and benefit not supportive. Fall risk and over sedation with Remeron.  Reviewed methods to increase intake, offer smaller more frequent servings, provide foods she prefers.  Reviewed the decreasing appetite and intake is part of dementia.  Reviewed the progression dementia and prognosis.  has some denial of her diagnosis as she never completed the work up with neurology.              Review of Systems   Constitutional: Positive for appetite change. Negative for activity change, fatigue and fever.   HENT: Negative.    Eyes: Negative.    Respiratory: Negative for chest tightness.    Cardiovascular: Negative.  Negative for leg swelling.   Gastrointestinal: Negative.    Endocrine: Negative.    Genitourinary: Negative.    Musculoskeletal: Positive for gait problem (uses walker).   Skin: Negative.    Allergic/Immunologic: Negative.    Hematological: Negative.    Psychiatric/Behavioral: Positive for confusion. Negative for agitation.   All other systems reviewed and are negative.      Assessments:  · Environmental: single family home  · Functional Status: requires some assistance  · Safety: fall risk  · Nutritional: adequate available  · Home Health/DME/Supplies: [facility]    PAST HISTORY:     Past Medical History:   Diagnosis  Date    CAD (coronary atherosclerotic disease)     Hypertension     Lewy body dementia     Unspecified dementia without behavioral disturbance        Past Surgical History:   Procedure Laterality Date    CORONARY ANGIOGRAPHY N/A 12/5/2019    Procedure: ANGIOGRAM, CORONARY ARTERY;  Surgeon: Justin Weiss MD;  Location: [facility] CATH LAB;  Service: Cardiology;  Laterality: N/A;    CORONARY STENT PLACEMENT      x2, Xience mid LAD, prox LAD    HYSTERECTOMY      LEFT HEART CATHETERIZATION Left 12/5/2019    Procedure: Left heart cath;  Surgeon: Justin Weiss MD;  Location: [facility] CATH LAB;  Service: Cardiology;  Laterality: Left;    TONSILLECTOMY         Family History   Problem Relation Age of Onset    Hypertension Mother     Kidney failure Mother     Parkinsonism Father     Heart attack Father        Social History     Socioeconomic History    Marital status:    Tobacco Use    Smoking status: Never Smoker    Smokeless tobacco: Never Used   Substance and Sexual Activity    Alcohol use: No    Drug use: No    Sexual activity: Never       MEDICATIONS & ALLERGIES:     Current Outpatient Medications on File Prior to Visit   Medication Sig Dispense Refill    acetaminophen (TYLENOL) 500 MG tablet Take 500 mg by mouth every 6 (six) hours as needed for Pain.      aspirin (ASPIR-81 ORAL) Take by mouth.      carbidopa-levodopa  mg (SINEMET)  mg per tablet Take 2.5 tablets by mouth 2 (two) times a day.      citalopram (CELEXA) 10 MG tablet Take 2 tablets (20 mg total) by mouth once daily for 4 days, THEN 1 tablet (10 mg total) once daily for 7 days. 15 tablet 0    clopidogrel (PLAVIX) 75 mg tablet Take 1 tablet (75 mg total) by mouth once daily. 30 tablet 11    container,empty (NASAL SPRAY BOTTLE MISC) by Misc.(Non-Drug; Combo Route) route.      flash glucose scanning reader (SmavaYLE STAN 14 DAY READER) Misc Use as directed.  Dx hypoglycemia (Patient not taking: Reported on  8/4/2022) 1 each 0    flash glucose sensor (FREESTYLE STAN 14 DAY SENSOR) Kit Use as directed.  Dx hypoglycemia (Patient not taking: Reported on 8/4/2022) 2 kit 3    levothyroxine (SYNTHROID) 25 MCG tablet Take 25 mcg by mouth once daily.  11    losartan (COZAAR) 50 MG tablet Take 1 tablet (50 mg total) by mouth 2 (two) times a day. 60 tablet 2    mirtazapine (REMERON) 7.5 MG Tab Take 1 tablet (7.5 mg total) by mouth every evening. 90 tablet 0    multivitamin Tab Take 1 tablet by mouth once daily. 90 tablet 0    nitroGLYCERIN (NITROSTAT) 0.4 MG SL tablet TAKE 1 TAB UNDER THE TONGUE EVERY 5 MIN X3 DOSES IF NEEDED FOR CHEST PAIN,IF NO RELIEF GO TO THE ER.  1    ondansetron (ZOFRAN) 4 MG tablet Take 4 mg by mouth every 8 (eight) hours as needed for Nausea.      simethicone (MYLICON) 125 MG chewable tablet Take 125 mg by mouth every 6 (six) hours as needed for Flatulence.      sodium chloride 1 gram tablet Take 1 tablet (1 g total) by mouth 3 (three) times daily. 90 tablet 0    tetrahydrozoline/polyethyl gly (EYE DROPS OPHT) Apply to eye.      [DISCONTINUED] rivastigmine tartrate (EXELON) 1.5 MG capsule Take 1.5 mg by mouth 2 (two) times daily.       No current facility-administered medications on file prior to visit.        Review of patient's allergies indicates:   Allergen Reactions    Penicillins Rash       OBJECTIVE:     Vital Signs:  There were no vitals filed for this visit.  There is no height or weight on file to calculate BMI.     Physical Exam:  Physical Exam  Vitals reviewed.   Constitutional:       General: She is not in acute distress.     Appearance: She is well-developed.      Comments: Thin, frail appearing   HENT:      Head: Normocephalic and atraumatic.   Eyes:      Pupils: Pupils are equal, round, and reactive to light.   Cardiovascular:      Rate and Rhythm: Normal rate and regular rhythm.      Heart sounds: Normal heart sounds.   Pulmonary:      Effort: Pulmonary effort is normal.       Breath sounds: Normal breath sounds.   Abdominal:      General: Bowel sounds are normal.      Palpations: Abdomen is soft.   Musculoskeletal:      Cervical back: Normal range of motion and neck supple.   Skin:     General: Skin is warm and dry.      Coloration: Skin is pale.   Neurological:      Mental Status: She is alert. She is disoriented.      Cranial Nerves: No cranial nerve deficit.      Gait: Gait abnormal.         Laboratory  Lab Results   Component Value Date    WBC 8.32 08/03/2022    HGB 11.7 (L) 08/03/2022    HCT 34.9 (L) 08/03/2022    MCV 94 08/03/2022     08/03/2022     Lab Results   Component Value Date    INR 1.0 08/07/2016     No results found for: HGBA1C  No results for input(s): POCTGLUCOSE in the last 72 hours.    Diagnostic Results:      TRANSITION OF CARE:     [facility] Contact Note: 8/3/22    Family and/or Caretaker present at visit?  Yes.  Diagnostic tests reviewed/disposition: I have reviewed all completed as well as pending diagnostic tests at the time of discharge.  Disease/illness education: Hyponatremia, HTN, Dementia  Home health/community services discussion/referrals: Patient has home health established at [facility].   Establishment or re-establishment of referral orders for community resources: No other necessary community resources.   Discussion with other health care providers: No discussion with other health care providers necessary.     Transition of Care Visit:  I have reviewed and updated the history and problem list.  I have reconciled the medication list.  I have discussed the hospitalization and current medical issues, prognosis and plans with the patient/family.  I  spent more than 50% of time discussing the care with the patient/family.  Total Face-to-Face Encounter: 60 minutes.    Medications Reconciliation:   I have reconciled the patient's home medications and discharge medications with the patient/family. I have updated all changes.  Refer to After-Visit  Medication List.    ASSESSMENT & PLAN:     HIGH RISK CONDITION(S):      Problem List Items Addressed This Visit        Neuro    Lewy body dementia    Current Assessment & Plan     Sinemet in use  Oriented to self and   Fall risk  Decreased appetite  Caregiver in place              Cardiac/Vascular    Hypertensive heart disease without heart failure    Current Assessment & Plan     Losartan in place  Elevated readings maybe due to salt tabs  Hold salt tabs  Notify for readings greater then 140/90  Home health following              Renal/    Hyponatremia    Current Assessment & Plan     SIADH suspected  Celexa weaned  Nephrology following  Na 132, serial labs weekly in place  Salt on hold due to HTN                  Were controlled substances prescribed?  No    Instructions for the patient:    Scheduled Follow-up :  Future Appointments   Date Time Provider Department Center   8/22/2022 10:15 AM Rosey Mendoza MD [facility]   9/22/2022  2:45 PM Rosey Mendoza MD [facility]       After Visit Medication List :     Medication List          Accurate as of August 17, 2022 11:59 PM. If you have any questions, ask your nurse or doctor.            CONTINUE taking these medications    acetaminophen 500 MG tablet  Commonly known as: TYLENOL     ASPIR-81 ORAL     carbidopa-levodopa  mg  mg per tablet  Commonly known as: SINEMET     citalopram 10 MG tablet  Commonly known as: CeleXA  Take 2 tablets (20 mg total) by mouth once daily for 4 days, THEN 1 tablet (10 mg total) once daily for 7 days.  Start taking on: August 1, 2022     clopidogreL 75 mg tablet  Commonly known as: PLAVIX  Take 1 tablet (75 mg total) by mouth once daily.     EYE DROPS OPHT     flash glucose scanning reader Misc  Commonly known as: FREESTYLE STAN 14 DAY READER  Use as directed.  Dx hypoglycemia     flash glucose sensor Kit  Commonly known as: FREESTYLE STAN 14 DAY SENSOR  Use as directed.  Dx hypoglycemia     levothyroxine 25  MCG tablet  Commonly known as: SYNTHROID     losartan 50 MG tablet  Commonly known as: COZAAR  Take 1 tablet (50 mg total) by mouth 2 (two) times a day.     mirtazapine 7.5 MG Tab  Commonly known as: REMERON  Take 1 tablet (7.5 mg total) by mouth every evening.     multivitamin Tab  Take 1 tablet by mouth once daily.     NASAL SPRAY BOTTLE MISC     nitroGLYCERIN 0.4 MG SL tablet  Commonly known as: NITROSTAT     ondansetron 4 MG tablet  Commonly known as: ZOFRAN     simethicone 125 MG chewable tablet  Commonly known as: MYLICON     sodium chloride 1 gram tablet  Take 1 tablet (1 g total) by mouth 3 (three) times daily.            Signature: Liz Lange NP

## 2022-08-18 NOTE — ASSESSMENT & PLAN NOTE
Losartan in place  Elevated readings maybe due to salt tabs  Hold salt tabs  Notify for readings greater then 140/90  Home health following

## 2022-08-19 ENCOUNTER — NURSE TRIAGE (OUTPATIENT)
Dept: ADMINISTRATIVE | Facility: CLINIC | Age: 87
End: 2022-08-19
Payer: MEDICARE

## 2022-08-19 DIAGNOSIS — I11.9 HYPERTENSIVE HEART DISEASE WITHOUT HEART FAILURE: Primary | ICD-10-CM

## 2022-08-22 RX ORDER — HYDRALAZINE HYDROCHLORIDE 10 MG/1
10 TABLET, FILM COATED ORAL 3 TIMES DAILY PRN
Qty: 90 TABLET | Refills: 11 | Status: SHIPPED | OUTPATIENT
Start: 2022-08-22 | End: 2022-09-19

## 2022-08-22 NOTE — TELEPHONE ENCOUNTER
Sent in hydralazine 10 mg TID prn BP > 140/90.  Pt seen recently by [facility]. Pt also had an appt with Dr. Bui in [place] that was canceled.  I'm more than happy to continue being her PCP but does she want to come see me to catch up?  Last seen by me 11/2020.

## 2022-08-23 ENCOUNTER — OUTPATIENT CASE MANAGEMENT (OUTPATIENT)
Dept: ADMINISTRATIVE | Facility: OTHER | Age: 87
End: 2022-08-23
Payer: MEDICARE

## 2022-08-24 ENCOUNTER — PATIENT MESSAGE (OUTPATIENT)
Dept: HOME HEALTH SERVICES | Facility: CLINIC | Age: 87
End: 2022-08-24
Payer: MEDICARE

## 2022-08-25 ENCOUNTER — EXTERNAL HOME HEALTH (OUTPATIENT)
Dept: HOME HEALTH SERVICES | Facility: HOSPITAL | Age: 87
End: 2022-08-25
Payer: MEDICARE

## 2022-08-26 ENCOUNTER — PATIENT MESSAGE (OUTPATIENT)
Dept: ADMINISTRATIVE | Facility: OTHER | Age: 87
End: 2022-08-26
Payer: MEDICARE

## 2022-08-31 ENCOUNTER — TELEPHONE (OUTPATIENT)
Dept: FAMILY MEDICINE | Facility: CLINIC | Age: 87
End: 2022-08-31
Payer: MEDICARE

## 2022-08-31 DIAGNOSIS — Z78.0 MENOPAUSE: ICD-10-CM

## 2022-08-31 NOTE — TELEPHONE ENCOUNTER
Spoke with pt's  in regards of message on patient, and pt needing to reschedule her appointment to an afternoon instead.  Pt's  rescheduled pt's appt to 9/15/2022 for 2:00 pm for her Hospital Follow Up.

## 2022-08-31 NOTE — TELEPHONE ENCOUNTER
----- Message from Demetra Mixon RN sent at 8/31/2022 10:50 AM CDT -----  Regarding: Appt request  Contact: Demetra Manzanares    This is Demetra with Outpatient Case Management. Pt has an appt with you on 9/8/22, but its a morning appt. Pt's son stated that morning appts are not good for the pt because she gets her meals delivered in the AM and if she's not home they won't leave the meals. She also has a sitter that comes in the mornings. Pt's son is asking if there are any afternoon appts soon sometime after 1 pm? Please Advise. Thank You    VENITA Seay, RN  [facility]  [email or web address]  TEL:  [phone]

## 2022-08-31 NOTE — TELEPHONE ENCOUNTER
----- Message from Sarah Braxton sent at 8/31/2022  1:42 PM CDT -----  .Type:  Needs Medical Advice    Who Called: self  Would the patient rather a call back or a response via [facility]? Call back   Best Call Back Number: [phone]  Additional Information: patient would like a call back concerning her appointment please

## 2022-08-31 NOTE — TELEPHONE ENCOUNTER
Spoke with pt's  in regards of message on pt. He called to inform me that they can't make it on 9/15/2022, due to him having an appt. He reschedule appt to 9/19/2022 for 1:00 pm instead.

## 2022-09-02 ENCOUNTER — PES CALL (OUTPATIENT)
Dept: ADMINISTRATIVE | Facility: OTHER | Age: 87
End: 2022-09-02
Payer: MEDICARE

## 2022-09-19 ENCOUNTER — OFFICE VISIT (OUTPATIENT)
Dept: FAMILY MEDICINE | Facility: CLINIC | Age: 87
End: 2022-09-19
Payer: MEDICARE

## 2022-09-19 VITALS
DIASTOLIC BLOOD PRESSURE: 86 MMHG | OXYGEN SATURATION: 98 % | SYSTOLIC BLOOD PRESSURE: 114 MMHG | WEIGHT: 100 LBS | HEART RATE: 85 BPM | HEIGHT: 62 IN | BODY MASS INDEX: 18.4 KG/M2 | TEMPERATURE: 98 F

## 2022-09-19 DIAGNOSIS — G31.83 LEWY BODY DEMENTIA WITHOUT BEHAVIORAL DISTURBANCE: ICD-10-CM

## 2022-09-19 DIAGNOSIS — E78.2 HYPERLIPIDEMIA, MIXED: ICD-10-CM

## 2022-09-19 DIAGNOSIS — Z74.09 LIMITED MOBILITY: ICD-10-CM

## 2022-09-19 DIAGNOSIS — R63.8 INADEQUATE ORAL INTAKE: ICD-10-CM

## 2022-09-19 DIAGNOSIS — Z76.89 ENCOUNTER TO ESTABLISH CARE WITH NEW DOCTOR: Primary | ICD-10-CM

## 2022-09-19 DIAGNOSIS — I25.10 CAD S/P PERCUTANEOUS CORONARY ANGIOPLASTY: ICD-10-CM

## 2022-09-19 DIAGNOSIS — E87.1 HYPONATREMIA: ICD-10-CM

## 2022-09-19 DIAGNOSIS — Z98.61 CAD S/P PERCUTANEOUS CORONARY ANGIOPLASTY: ICD-10-CM

## 2022-09-19 DIAGNOSIS — E03.9 ACQUIRED HYPOTHYROIDISM: ICD-10-CM

## 2022-09-19 DIAGNOSIS — F33.0 MILD RECURRENT MAJOR DEPRESSION: ICD-10-CM

## 2022-09-19 DIAGNOSIS — N18.31 CHRONIC KIDNEY DISEASE, STAGE 3A: ICD-10-CM

## 2022-09-19 DIAGNOSIS — F02.80 LEWY BODY DEMENTIA WITHOUT BEHAVIORAL DISTURBANCE: ICD-10-CM

## 2022-09-19 DIAGNOSIS — I11.9 HYPERTENSIVE HEART DISEASE WITHOUT HEART FAILURE: ICD-10-CM

## 2022-09-19 PROBLEM — Z95.5 HISTORY OF CORONARY ARTERY STENT PLACEMENT: Status: RESOLVED | Noted: 2018-11-20 | Resolved: 2022-09-19

## 2022-09-19 PROBLEM — R41.3 MEMORY LOSS: Status: RESOLVED | Noted: 2020-09-02 | Resolved: 2022-09-19

## 2022-09-19 PROBLEM — N39.0 UTI (URINARY TRACT INFECTION): Status: RESOLVED | Noted: 2022-07-28 | Resolved: 2022-09-19

## 2022-09-19 PROBLEM — H90.11 CONDUCTIVE HEARING LOSS OF RIGHT EAR WITH UNRESTRICTED HEARING OF LEFT EAR: Status: RESOLVED | Noted: 2018-11-20 | Resolved: 2022-09-19

## 2022-09-19 PROBLEM — H92.01 OTALGIA OF RIGHT EAR: Status: RESOLVED | Noted: 2018-11-20 | Resolved: 2022-09-19

## 2022-09-19 PROCEDURE — 99999 PR PBB SHADOW E&M-EST. PATIENT-LVL V: ICD-10-PCS | Mod: PBBFAC,,, | Performed by: STUDENT IN AN ORGANIZED HEALTH CARE EDUCATION/TRAINING PROGRAM

## 2022-09-19 PROCEDURE — 99999 PR PBB SHADOW E&M-EST. PATIENT-LVL V: CPT | Mod: PBBFAC,,, | Performed by: STUDENT IN AN ORGANIZED HEALTH CARE EDUCATION/TRAINING PROGRAM

## 2022-09-19 PROCEDURE — 99214 PR OFFICE/OUTPT VISIT, EST, LEVL IV, 30-39 MIN: ICD-10-PCS | Mod: S$PBB,,, | Performed by: STUDENT IN AN ORGANIZED HEALTH CARE EDUCATION/TRAINING PROGRAM

## 2022-09-19 PROCEDURE — 99214 OFFICE O/P EST MOD 30 MIN: CPT | Mod: S$PBB,,, | Performed by: STUDENT IN AN ORGANIZED HEALTH CARE EDUCATION/TRAINING PROGRAM

## 2022-09-19 PROCEDURE — 99215 OFFICE O/P EST HI 40 MIN: CPT | Mod: PBBFAC,PN | Performed by: STUDENT IN AN ORGANIZED HEALTH CARE EDUCATION/TRAINING PROGRAM

## 2022-09-19 RX ORDER — HYDRALAZINE HYDROCHLORIDE 25 MG/1
25 TABLET, FILM COATED ORAL 3 TIMES DAILY PRN
COMMUNITY
Start: 2022-08-19 | End: 2022-11-10

## 2022-09-19 RX ORDER — LOSARTAN POTASSIUM 25 MG/1
25 TABLET ORAL 2 TIMES DAILY
Qty: 90 TABLET | Refills: 3 | Status: SHIPPED | OUTPATIENT
Start: 2022-09-19 | End: 2023-03-31

## 2022-09-19 RX ORDER — RIVASTIGMINE TARTRATE 1.5 MG/1
1.5 CAPSULE ORAL 2 TIMES DAILY
Qty: 90 CAPSULE | Refills: 3
Start: 2022-09-19 | End: 2023-01-23 | Stop reason: SDUPTHER

## 2022-09-19 NOTE — PROGRESS NOTES
Subjective:       Patient ID: Mauri Matias is a 86 y.o. female.    Chief Complaint: Establish Care (Pt here to [facility] care and pt also recently had a hospital visit last month. )    Chronic kidney disease, stage 3a  Follows with nephrology   stable    Lewy body dementia  Follows with neurology; [employer]  Overdue for follow up    Sinemet   restarted rivastigmine; wants to monitor sodium levels     Mild recurrent major depression  Off medications 2/2 hyponatremia     CAD S/P percutaneous coronary angioplasty  Follows with cards; [facility]  plavix and asa    Hyperlipidemia, mixed  Diet controlled    Hypertensive heart disease without heart failure  BP up and down  When needed salt was slightly elevated; losartan was increased to 50mg  Hydralazine 25 TID PRN    Hyponatremia  S/p hospitalization  Numbers stable   Was below 120   Stopped celexa       Acquired hypothyroidism  Per cards on synthroid  Suspected to help with depression   TSH level on low end but per labs with cards ok   Taking sinemet and synthroid together     Inadequate oral intake  Decreased appetite 2/2 dementia   Has tried megace but did not feel worth risk of DVT; tried Remeron in hospital but this made her want to sleep all day   Has been eating more; and has been able to increase protein levels but not eating the amount she used to and not able to add weight which is concerning to son   Review of Systems   Objective:      Vitals:    09/19/22 1317   BP: 114/86   Pulse: 85   Temp: 97.5 °F (36.4 °C)      Physical Exam     Assessment:       1. Encounter to establish care with new doctor    2. Lewy body dementia without behavioral disturbance    3. Hyponatremia    4. Chronic kidney disease, stage 3a    5. Limited mobility    6. Mild recurrent major depression    7. CAD S/P percutaneous coronary angioplasty    8. Hyperlipidemia, mixed    9. Hypertensive heart disease without heart failure    10. Acquired hypothyroidism    11. Inadequate oral intake          Plan:    1. Encounter to establish care with new doctor    2. Lewy body dementia without behavioral disturbance  - rivastigmine tartrate (EXELON) 1.5 MG capsule; Take 1 capsule (1.5 mg total) by mouth 2 (two) times daily.  Dispense: 90 capsule; Refill: 3  - Comprehensive Metabolic Panel; Standing    3. Hyponatremia  - Comprehensive Metabolic Panel; Standing    4. Chronic kidney disease, stage 3a    5. Limited mobility  - Ambulatory referral/consult to Physical/Occupational Therapy; Future    6. Mild recurrent major depression    7. CAD S/P percutaneous coronary angioplasty    8. Hyperlipidemia, mixed    9. Hypertensive heart disease without heart failure  - losartan (COZAAR) 25 MG tablet; Take 1 tablet (25 mg total) by mouth 2 (two) times a day.  Dispense: 90 tablet; Refill: 3    10. Acquired hypothyroidism    11. Inadequate oral intake     Establish care  Labs per orders  HM DEFER  Refer to PT for strengthening   Decrease losartan to 25 BID; continue to monitor BP at home   Restart Rivastigmine; monitor Na every 2 weeks with CMP  Continue sitter but will back off to weekdays only 9-3 to allow for more personal freedom  Encourage oral intake; NO RESTRICTIONS; Appetite stimulants not recommended as often cause more harm; pt should be encouraged to eat when hungry anything she likes  Discussed stopping synthroid since very low dose and would help to make medication administration easier but  was concerned that Dr Weiss started this and has said her labs look good so he would prefer to continue   Continue current meds as prescribed otherwise  Keep routine specialist f/u   RTC in 6 months and/or PRN        [facility]   9/19/22

## 2022-09-19 NOTE — ASSESSMENT & PLAN NOTE
Decreased appetite 2/2 dementia   Has tried megace but did not feel worth risk of DVT; tried Remeron in hospital but this made her want to sleep all day   Has been eating more; and has been able to increase protein levels but not eating the amount she used to and not able to add weight which is concerning to son

## 2022-09-19 NOTE — ASSESSMENT & PLAN NOTE
Per cards on synthroid  Suspected to help with depression   TSH level on low end but per labs with cards ok   Taking sinemet and synthroid together

## 2022-09-19 NOTE — ASSESSMENT & PLAN NOTE
BP up and down  When needed salt was slightly elevated; losartan was increased to 50mg  Hydralazine 25 TID PRN

## 2022-09-19 NOTE — ASSESSMENT & PLAN NOTE
Follows with neurology; [employer]  Overdue for follow up    Sinemet   restarted rivastigmine; wants to monitor sodium levels

## 2022-09-20 ENCOUNTER — TELEPHONE (OUTPATIENT)
Dept: HOME HEALTH SERVICES | Facility: CLINIC | Age: 87
End: 2022-09-20
Payer: MEDICARE

## 2022-09-23 ENCOUNTER — DOCUMENT SCAN (OUTPATIENT)
Dept: HOME HEALTH SERVICES | Facility: HOSPITAL | Age: 87
End: 2022-09-23
Payer: MEDICARE

## 2022-10-31 ENCOUNTER — CARE AT HOME (OUTPATIENT)
Dept: HOME HEALTH SERVICES | Facility: CLINIC | Age: 87
End: 2022-10-31
Payer: MEDICARE

## 2022-10-31 VITALS
HEART RATE: 77 BPM | DIASTOLIC BLOOD PRESSURE: 80 MMHG | OXYGEN SATURATION: 97 % | SYSTOLIC BLOOD PRESSURE: 138 MMHG | RESPIRATION RATE: 16 BRPM

## 2022-10-31 DIAGNOSIS — G31.83 MODERATE LEWY BODY DEMENTIA WITHOUT BEHAVIORAL DISTURBANCE, PSYCHOTIC DISTURBANCE, MOOD DISTURBANCE, OR ANXIETY: ICD-10-CM

## 2022-10-31 DIAGNOSIS — F02.B0 MODERATE LEWY BODY DEMENTIA WITHOUT BEHAVIORAL DISTURBANCE, PSYCHOTIC DISTURBANCE, MOOD DISTURBANCE, OR ANXIETY: ICD-10-CM

## 2022-10-31 DIAGNOSIS — I11.9 HYPERTENSIVE HEART DISEASE WITHOUT HEART FAILURE: ICD-10-CM

## 2022-10-31 PROCEDURE — 99350 PR HOME VISIT,ESTAB PATIENT,LEVEL IV: ICD-10-PCS | Mod: S$GLB,,, | Performed by: NURSE PRACTITIONER

## 2022-10-31 PROCEDURE — 99350 HOME/RES VST EST HIGH MDM 60: CPT | Mod: S$GLB,,, | Performed by: NURSE PRACTITIONER

## 2022-10-31 RX ORDER — PREGABALIN 50 MG/1
50 CAPSULE ORAL 2 TIMES DAILY
COMMUNITY
Start: 2022-10-28 | End: 2023-01-23

## 2022-10-31 NOTE — ASSESSMENT & PLAN NOTE
Readings per family log are high normals  Has only used Hydralazine twice this month  Taking Losartan 25mg daily at this time  Monitor

## 2022-10-31 NOTE — ASSESSMENT & PLAN NOTE
Followed by neurology  Oriented to self and  today  Difficulty finding words to answer questions  Sinemet in place  Recently resumed Rivastigmine-nephrology monitoring Na

## 2023-01-23 ENCOUNTER — CARE AT HOME (OUTPATIENT)
Dept: HOME HEALTH SERVICES | Facility: CLINIC | Age: 88
End: 2023-01-23
Payer: MEDICARE

## 2023-01-23 VITALS
DIASTOLIC BLOOD PRESSURE: 78 MMHG | SYSTOLIC BLOOD PRESSURE: 134 MMHG | OXYGEN SATURATION: 98 % | HEART RATE: 72 BPM | RESPIRATION RATE: 18 BRPM

## 2023-01-23 DIAGNOSIS — G20.A1 PARKINSON'S DISEASE: ICD-10-CM

## 2023-01-23 DIAGNOSIS — F33.0 MILD RECURRENT MAJOR DEPRESSION: ICD-10-CM

## 2023-01-23 DIAGNOSIS — G31.83 LEWY BODY DEMENTIA WITHOUT BEHAVIORAL DISTURBANCE: Primary | ICD-10-CM

## 2023-01-23 DIAGNOSIS — F02.80 LEWY BODY DEMENTIA WITHOUT BEHAVIORAL DISTURBANCE: Primary | ICD-10-CM

## 2023-01-23 DIAGNOSIS — I70.0 AORTIC ATHEROSCLEROSIS: ICD-10-CM

## 2023-01-23 DIAGNOSIS — E21.3 HYPERPARATHYROIDISM: ICD-10-CM

## 2023-01-23 DIAGNOSIS — I11.0 HYPERTENSIVE HEART DISEASE WITH HEART FAILURE: ICD-10-CM

## 2023-01-23 DIAGNOSIS — N18.31 CHRONIC KIDNEY DISEASE, STAGE 3A: ICD-10-CM

## 2023-01-23 PROCEDURE — 99350 HOME/RES VST EST HIGH MDM 60: CPT | Mod: S$GLB,,, | Performed by: NURSE PRACTITIONER

## 2023-01-23 PROCEDURE — 99350 PR HOME VISIT,ESTAB PATIENT,LEVEL IV: ICD-10-PCS | Mod: S$GLB,,, | Performed by: NURSE PRACTITIONER

## 2023-01-23 RX ORDER — RIVASTIGMINE TARTRATE 1.5 MG/1
1.5 CAPSULE ORAL 2 TIMES DAILY
Qty: 90 CAPSULE | Refills: 3 | Status: SHIPPED | OUTPATIENT
Start: 2023-01-23

## 2023-01-23 NOTE — ASSESSMENT & PLAN NOTE
Followed by neurology  Oriented to self and place  Difficulty finding words  Sinemet in place  Monitor   Continue current plan

## 2023-01-23 NOTE — ASSESSMENT & PLAN NOTE
Followed by neurology  Oriented to self and place  Difficulty finding words  Exelon in place  Monitor   Continue current plan

## 2023-01-23 NOTE — PROGRESS NOTES
[facility]  Medical Home Visit    Visit Date: 1/23/2023  Encounter Provider: Liz Lange  PCP:  Mackenzie Bui DO    Subjective:      Patient ID: Mauri Matias is a 87 y.o. female.    Consult Requested By:  No ref. provider found  Reason for Consult:  Routine FU, HTN Dementia    Mauri is being seen at home due to being seen at home due to physical debility that presents a taxing effort to leave the home, to mitigate high risk of hospital readmission and/or due to the limited availability of reliable or safe options for transportation to the point of access to the provider. Prior to treatment on this visit the chart was reviewed and patient verbal consent was obtained.    Chief Complaint: Dementia and Hypertension        Pt is being seen today in her home with her  present for routine follow up.  She is awake and alert, ambulating in her home.  Her  answers most questions, she seems to understand questions asked of her but cannot form the words to answer. She refers to her  to answer most questions.  She is in her usual state of health per her spouse.    No recent falls,  reports appetite is good.   She has not had an recent problems with her hyponatremia.   reports her meds have been adjsuted since our last visit.  She is now taking B/p meds at bedtime and pressure is better controlled  He is concerned as her exelon will run out this week. He is attempting to refill thru pharmacy but is not sure what the problem is. He attempted to have provider listen to message on answering machine, but he is unable to play. Refilled to pharmacy             Review of Systems   Constitutional:  Positive for appetite change (fluctuates). Negative for activity change, fatigue and fever.   HENT: Negative.     Eyes: Negative.    Respiratory:  Negative for chest tightness.    Cardiovascular: Negative.  Negative for leg swelling.   Gastrointestinal: Negative.    Endocrine: Negative.     Genitourinary: Negative.    Musculoskeletal:  Positive for arthralgias. Back pain: ankle pain.  Skin: Negative.    Allergic/Immunologic: Negative.    Neurological: Negative.    Hematological: Negative.    Psychiatric/Behavioral:  Positive for confusion. Negative for agitation.    All other systems reviewed and are negative.    Assessments:  Environmental: single family home, lives with spouse, open paths  Functional Status: requires some assistance  Safety: No noted issues  Nutritional: adequate available  Home Health/DME/Supplies: None    Objective:   Physical Exam  Vitals reviewed.   Constitutional:       General: She is not in acute distress.     Appearance: She is well-developed.      Comments: thin   HENT:      Head: Normocephalic and atraumatic.      Nose: Nose normal.      Mouth/Throat:      Mouth: Mucous membranes are dry.   Eyes:      Pupils: Pupils are equal, round, and reactive to light.   Cardiovascular:      Rate and Rhythm: Normal rate and regular rhythm.      Heart sounds: Normal heart sounds.   Pulmonary:      Effort: Pulmonary effort is normal.      Breath sounds: Normal breath sounds.   Abdominal:      General: Bowel sounds are normal.      Palpations: Abdomen is soft.   Musculoskeletal:         General: Normal range of motion.      Cervical back: Normal range of motion and neck supple.   Skin:     General: Skin is warm and dry.   Neurological:      Mental Status: She is alert. Mental status is at baseline. She is disoriented.      Cranial Nerves: No cranial nerve deficit.   Psychiatric:         Behavior: Behavior normal.       Vitals:    01/23/23 0934   BP: 134/78   Pulse: 72   Resp: 18   SpO2: 98%   PainSc: 0-No pain       There is no height or weight on file to calculate BMI.    Assessment:     1. Lewy body dementia without behavioral disturbance    2. Parkinson's disease    3. Aortic atherosclerosis    4. Hyperparathyroidism    5. Mild recurrent major depression    6. Chronic kidney disease,  stage 3a    7. Hypertensive heart disease with heart failure          Plan:            Problem List Items Addressed This Visit          Neuro    Lewy body dementia - Primary    Current Assessment & Plan     Followed by neurology  Oriented to self and place  Difficulty finding words  Exelon in place  Monitor   Continue current plan         Relevant Medications    rivastigmine tartrate (EXELON) 1.5 MG capsule    Parkinson's disease    Current Assessment & Plan     Followed by neurology  Oriented to self and place  Difficulty finding words  Sinemet in place  Monitor   Continue current plan            Psychiatric    Mild recurrent major depression    Current Assessment & Plan     Stable, recurrent mild currently controlled   Continue Exelon  Monitor            Cardiac/Vascular    Hypertensive heart disease with heart failure    Overview     Diastolic dysfunction on angiogram 12/5/2019         Current Assessment & Plan     Stable  Normotensive today  No SOB or edema  Continue Losartan 25mg  Monitor         Aortic atherosclerosis    Overview     Noted on CXR dated 7/27/22  Atherosclerosis of thoracic aorta         Current Assessment & Plan     Stable  ASA and plavix in place  Monitor            Renal/    Chronic kidney disease, stage 3a    Current Assessment & Plan     Followed by nephrology  Renal dosed meds  Monitor            Endocrine    Hyperparathyroidism    Current Assessment & Plan     PTH of 258 5 month ago  Currently taking Vitamin D  Repeat PTH with upcoming labs  Monitor         Relevant Orders    PTH, intact          Were controlled substances prescribed?  No  Total visit time: 65 min  Follow Up Appointments:   Future Appointments   Date Time Provider Department Center   3/16/2023  2:15 PM Rosey Mendoza MD [facility]   3/27/2023  2:40 PM DO FERNANDA Colin [facility] [place]   4/24/2023  8:00 AM Liz Lange NP [facility] [place]       Signature: Liz Lange NP

## 2023-02-01 ENCOUNTER — TELEPHONE (OUTPATIENT)
Dept: FAMILY MEDICINE | Facility: CLINIC | Age: 88
End: 2023-02-01
Payer: MEDICARE

## 2023-02-01 NOTE — TELEPHONE ENCOUNTER
Spoke to pt  he states that his wife was sleeping and wasn't aware of her calling the office. He went and woke her up. She stated that she didn't remember calling the office either. I told her that if she happened to remember calling the office later to just give us a call back.

## 2023-02-01 NOTE — TELEPHONE ENCOUNTER
----- Message from Joe Quiles sent at 2/1/2023  8:32 AM CST -----  Type:  Needs Medical Advice    Who Called: dante  Reason:Pt has been more confused lately and he wants UA and culture orders  Would the patient rather a call back or a response via [facility]? call  Best Call Back Number: [phone]

## 2023-02-02 ENCOUNTER — TELEPHONE (OUTPATIENT)
Dept: FAMILY MEDICINE | Facility: CLINIC | Age: 88
End: 2023-02-02
Payer: MEDICARE

## 2023-02-02 DIAGNOSIS — R35.0 FREQUENCY OF URINATION: Primary | ICD-10-CM

## 2023-02-02 NOTE — TELEPHONE ENCOUNTER
----- Message from Israel Mackenzie sent at 2/2/2023  8:03 AM CST -----  Contact: Pt.   .Type:  Needs Medical Advice    Who Called: pt. Ag Mckinney  Would the patient rather a call back or a response via [facility]?  Call back  Best Call Back Number: [phone]  Additional Information:  Pt.son  is requesting a UA and urine culture be order for his mother because  she has been confused lately.  2nd request

## 2023-02-02 NOTE — TELEPHONE ENCOUNTER
Called and left a voice message for the pt son to give me a call back at the office. I had called the pt back the other day. Neither the pt or the pt  knew anything about the ua  or uranalysis. I was unable to get the pt scheduled to have the test done.

## 2023-02-03 ENCOUNTER — TELEPHONE (OUTPATIENT)
Dept: ADMINISTRATIVE | Facility: CLINIC | Age: 88
End: 2023-02-03
Payer: MEDICARE

## 2023-02-03 RX ORDER — CIPROFLOXACIN 500 MG/1
500 TABLET ORAL 2 TIMES DAILY
Qty: 14 TABLET | Refills: 0 | Status: SHIPPED | OUTPATIENT
Start: 2023-02-03 | End: 2023-02-10

## 2023-02-03 NOTE — TELEPHONE ENCOUNTER
Orders faxed to [facility] for collection of urine culture.  Fax confirmation received.

## 2023-03-03 ENCOUNTER — PATIENT MESSAGE (OUTPATIENT)
Dept: FAMILY MEDICINE | Facility: CLINIC | Age: 88
End: 2023-03-03
Payer: MEDICARE

## 2023-03-03 DIAGNOSIS — R41.0 DELIRIUM: Primary | ICD-10-CM

## 2023-03-03 NOTE — TELEPHONE ENCOUNTER
Called and spoke with son got the appointment scheduled. Son said they will be going some time this afternoon. He asked if someone will be there to help her give the urine sample. I informed him to ask whoever calls her to the back to do the urine sample. Pt son verbalized understanding.

## 2023-03-27 ENCOUNTER — OFFICE VISIT (OUTPATIENT)
Dept: FAMILY MEDICINE | Facility: CLINIC | Age: 88
End: 2023-03-27
Payer: MEDICARE

## 2023-03-27 VITALS
TEMPERATURE: 98 F | WEIGHT: 98.88 LBS | HEART RATE: 102 BPM | HEIGHT: 62 IN | OXYGEN SATURATION: 97 % | BODY MASS INDEX: 18.2 KG/M2 | DIASTOLIC BLOOD PRESSURE: 86 MMHG | SYSTOLIC BLOOD PRESSURE: 134 MMHG

## 2023-03-27 DIAGNOSIS — I11.0 HYPERTENSIVE HEART DISEASE WITH HEART FAILURE: ICD-10-CM

## 2023-03-27 DIAGNOSIS — N18.31 CHRONIC KIDNEY DISEASE, STAGE 3A: ICD-10-CM

## 2023-03-27 DIAGNOSIS — F02.B0 MODERATE LEWY BODY DEMENTIA WITHOUT BEHAVIORAL DISTURBANCE, PSYCHOTIC DISTURBANCE, MOOD DISTURBANCE, OR ANXIETY: ICD-10-CM

## 2023-03-27 DIAGNOSIS — G20.A1 PARKINSON'S DISEASE: ICD-10-CM

## 2023-03-27 DIAGNOSIS — I25.10 CAD S/P PERCUTANEOUS CORONARY ANGIOPLASTY: ICD-10-CM

## 2023-03-27 DIAGNOSIS — E21.3 HYPERPARATHYROIDISM: ICD-10-CM

## 2023-03-27 DIAGNOSIS — E03.9 ACQUIRED HYPOTHYROIDISM: ICD-10-CM

## 2023-03-27 DIAGNOSIS — Z98.61 CAD S/P PERCUTANEOUS CORONARY ANGIOPLASTY: ICD-10-CM

## 2023-03-27 DIAGNOSIS — G31.83 MODERATE LEWY BODY DEMENTIA WITHOUT BEHAVIORAL DISTURBANCE, PSYCHOTIC DISTURBANCE, MOOD DISTURBANCE, OR ANXIETY: ICD-10-CM

## 2023-03-27 PROBLEM — E78.2 HYPERLIPIDEMIA, MIXED: Status: RESOLVED | Noted: 2020-03-04 | Resolved: 2023-03-27

## 2023-03-27 PROBLEM — I70.0 AORTIC ATHEROSCLEROSIS: Status: RESOLVED | Noted: 2023-01-23 | Resolved: 2023-03-27

## 2023-03-27 PROBLEM — F33.0 MILD RECURRENT MAJOR DEPRESSION: Status: RESOLVED | Noted: 2020-06-29 | Resolved: 2023-03-27

## 2023-03-27 PROCEDURE — 99214 OFFICE O/P EST MOD 30 MIN: CPT | Mod: PBBFAC,PN | Performed by: STUDENT IN AN ORGANIZED HEALTH CARE EDUCATION/TRAINING PROGRAM

## 2023-03-27 PROCEDURE — 99215 PR OFFICE/OUTPT VISIT, EST, LEVL V, 40-54 MIN: ICD-10-PCS | Mod: S$PBB,,, | Performed by: STUDENT IN AN ORGANIZED HEALTH CARE EDUCATION/TRAINING PROGRAM

## 2023-03-27 PROCEDURE — 99999 PR PBB SHADOW E&M-EST. PATIENT-LVL IV: CPT | Mod: PBBFAC,,, | Performed by: STUDENT IN AN ORGANIZED HEALTH CARE EDUCATION/TRAINING PROGRAM

## 2023-03-27 PROCEDURE — 99215 OFFICE O/P EST HI 40 MIN: CPT | Mod: S$PBB,,, | Performed by: STUDENT IN AN ORGANIZED HEALTH CARE EDUCATION/TRAINING PROGRAM

## 2023-03-27 PROCEDURE — 99999 PR PBB SHADOW E&M-EST. PATIENT-LVL IV: ICD-10-PCS | Mod: PBBFAC,,, | Performed by: STUDENT IN AN ORGANIZED HEALTH CARE EDUCATION/TRAINING PROGRAM

## 2023-03-27 NOTE — PROGRESS NOTES
Subjective:       Patient ID: Mauri Matias is a 87 y.o. female.    Chief Complaint: Follow-up (6 month fu )      Active Problem List with Overview Notes    Diagnosis Date Noted    Parkinson's disease 01/23/2023     Follows with neuro  Sinemet 25/100 2.5 tabs BID  exelon       Chronic kidney disease, stage 3a 09/19/2022     Follows with nephology   Back to baseline  SIADH 2/2 SSRI now off      Hyperparathyroidism 07/30/2022     Follows with nephrology       Inadequate oral intake 07/29/2022     R/T decreased appetite and Dementia AEB 25% of meals.       Lewy body dementia 07/28/2022     Follows with neuro  Sinemet 25/100 2.5 tabs BID  exelon   Progression of disease lately       GERD (gastroesophageal reflux disease) 12/04/2019    Acquired hypothyroidism 08/08/2016     Synthroid 25  Stable  asymptomatic       CAD S/P percutaneous coronary angioplasty 08/02/2016     Follows with cards; [facility]      Hypertensive heart disease with heart failure      Home readings are controlled  Follows with [facility] q6 months  Always elevated at doctors due to stress         complains of right hip pain; no injuries; worse with weahter; suspect OA; not to the point wehre taking medication (tylneol) just wanted to mention; notes some bleeding last night; band aid applied per  who says did not see blood and no longer bleeding per pt; they believe she scratched self     Review of Systems   Unable to perform ROS: Dementia      A1C:      CBC:  Recent Labs   Lab 02/02/23  1436 03/03/23  1703 03/15/23  1330   WBC 7.70 7.26 7.45   RBC 4.41 4.39 4.33   Hemoglobin 13.8 13.8 13.5   Hematocrit 42.1 41.6 40.9   Platelets 263 225 213   MCV 96 95 95   MCH 31.3 H 31.4 H 31.2 H   MCHC 32.8 33.2 33.0     CMP:  Recent Labs   Lab 02/02/23  1436 03/03/23  1703 03/15/23  1330   Glucose 80 95 112 H   Calcium 9.1 9.2 8.7   Albumin 4.3 4.5 4.3   Total Protein 7.0 7.1 6.8   Sodium 140 138 137   Potassium 4.2 3.8 4.3   CO2 32 H 29 28   Chloride 98 100  101   BUN 32 H 29 H 30 H   Creatinine 1.00 0.82 0.88   Alkaline Phosphatase 74 80 57   ALT 8 L 8 L 7 L   AST 26 27 25   Total Bilirubin 0.5 0.4 0.6     LIPIDS:  Recent Labs   Lab 07/28/22  0601   TSH 0.928     TSH:  Recent Labs   Lab 05/12/20  0919 07/28/22  0601   TSH 3.020 0.928        Objective:      Vitals:    03/27/23 1436   BP: 134/86   Pulse: 102   Temp: 97.9 °F (36.6 °C)      Physical Exam  Vitals reviewed.   Constitutional:       Appearance: Normal appearance. She is normal weight.   HENT:      Head: Normocephalic and atraumatic.   Eyes:      Conjunctiva/sclera: Conjunctivae normal.   Cardiovascular:      Rate and Rhythm: Normal rate and regular rhythm.      Heart sounds: Normal heart sounds.   Pulmonary:      Effort: Pulmonary effort is normal.      Breath sounds: Normal breath sounds.   Abdominal:      Palpations: Abdomen is soft.      Tenderness: There is no abdominal tenderness.   Musculoskeletal:         General: Normal range of motion.      Cervical back: Normal range of motion.      Right lower leg: No edema.      Left lower leg: No edema.   Neurological:      General: No focal deficit present.      Mental Status: She is alert. Mental status is at baseline. She is disoriented and confused.   Psychiatric:         Mood and Affect: Mood normal. Affect is flat.         Speech: Speech is tangential.         Behavior: Behavior is slowed.         Cognition and Memory: Cognition is impaired. Memory is impaired.        Assessment:       1. Parkinson's disease    2. Moderate Lewy body dementia without behavioral disturbance, psychotic disturbance, mood disturbance, or anxiety    3. Hypertensive heart disease with heart failure    4. CAD S/P percutaneous coronary angioplasty    5. Chronic kidney disease, stage 3a    6. Hyperparathyroidism    7. Acquired hypothyroidism          Plan:   1. Parkinson's disease    2. Moderate Lewy body dementia without behavioral disturbance, psychotic disturbance, mood  disturbance, or anxiety    3. Hypertensive heart disease with heart failure    4. CAD S/P percutaneous coronary angioplasty    5. Chronic kidney disease, stage 3a    6. Hyperparathyroidism    7. Acquired hypothyroidism     Labs reviewed in detail  Continue healthy lifestyle efforts  Continue current meds as prescribed otherwise; refills per request  Tylenol PRN for hip pains ok; likely arthritis related  Keep routine specialist f/u   RTC in 6 months  and/or PRN          Sarah A. Champagne [facility] Medicine   3/27/23     > 40 min spend with pt face to face and on chart review

## 2023-04-01 DIAGNOSIS — I11.9 HYPERTENSIVE HEART DISEASE WITHOUT HEART FAILURE: ICD-10-CM

## 2023-04-01 RX ORDER — LOSARTAN POTASSIUM 25 MG/1
TABLET ORAL
Qty: 90 TABLET | Refills: 3 | OUTPATIENT
Start: 2023-04-01

## 2023-04-01 NOTE — TELEPHONE ENCOUNTER
No new care gaps identified.  [facility] Embedded Care Gaps. Reference number: [id number]. 4/01/2023   5:07:37 PM CDT

## 2023-04-01 NOTE — TELEPHONE ENCOUNTER
Refill Decision Note   Mauri Matias  is requesting a refill authorization.  Brief Assessment and Rationale for Refill:  Quick Discontinue     Medication Therapy Plan:  Receipt confirmed by pharmacy (3/31/2023 10:15 AM CDT    Medication Reconciliation Completed: No     Comments:     No Care Gaps recommended.     Note composed:5:14 PM 04/01/2023

## 2023-05-03 ENCOUNTER — PES CALL (OUTPATIENT)
Dept: ADMINISTRATIVE | Facility: CLINIC | Age: 88
End: 2023-05-03
Payer: MEDICARE

## 2023-05-14 ENCOUNTER — PATIENT MESSAGE (OUTPATIENT)
Dept: FAMILY MEDICINE | Facility: CLINIC | Age: 88
End: 2023-05-14
Payer: MEDICARE

## 2023-05-14 DIAGNOSIS — E87.1 HYPONATREMIA: Primary | ICD-10-CM

## 2023-05-15 ENCOUNTER — TELEPHONE (OUTPATIENT)
Dept: FAMILY MEDICINE | Facility: CLINIC | Age: 88
End: 2023-05-15
Payer: MEDICARE

## 2023-05-15 NOTE — TELEPHONE ENCOUNTER
----- Message from Peyton Harley sent at 5/15/2023 11:34 AM CDT -----  .Type:  Needs Medical Advice    Who Called: pt son Hank    Would the patient rather a call back or a response via [facility]? Call back  Best Call Back Number: [phone]  Additional Information:     Pt son stated they missed a call and would like a call back please

## 2023-05-15 NOTE — TELEPHONE ENCOUNTER
Please schedule for BMP; TSH, T3 and T4 TODAY  Daily BMP ordered and HH order placed to draw (please verify they have [facility])

## 2023-05-16 PROCEDURE — G0180 MD CERTIFICATION HHA PATIENT: HCPCS | Mod: ,,, | Performed by: STUDENT IN AN ORGANIZED HEALTH CARE EDUCATION/TRAINING PROGRAM

## 2023-05-16 PROCEDURE — G0180 PR HOME HEALTH MD CERTIFICATION: ICD-10-PCS | Mod: ,,, | Performed by: STUDENT IN AN ORGANIZED HEALTH CARE EDUCATION/TRAINING PROGRAM

## 2023-05-19 ENCOUNTER — LAB VISIT (OUTPATIENT)
Dept: LAB | Facility: HOSPITAL | Age: 88
End: 2023-05-19
Attending: STUDENT IN AN ORGANIZED HEALTH CARE EDUCATION/TRAINING PROGRAM
Payer: MEDICARE

## 2023-05-19 ENCOUNTER — PATIENT MESSAGE (OUTPATIENT)
Dept: FAMILY MEDICINE | Facility: CLINIC | Age: 88
End: 2023-05-19
Payer: MEDICARE

## 2023-05-19 DIAGNOSIS — E87.1 HYPONATREMIA: Primary | ICD-10-CM

## 2023-05-19 LAB
ANION GAP SERPL CALC-SCNC: 9 MMOL/L (ref 8–16)
CALCIUM SERPL-MCNC: 9 MG/DL (ref 8.7–10.5)
CHLORIDE SERPL-SCNC: 102 MMOL/L (ref 95–110)
CO2 SERPL-SCNC: 27 MMOL/L (ref 23–29)
CREAT SERPL-MCNC: 0.92 MG/DL (ref 0.5–1.4)
EST. GFR  (NO RACE VARIABLE): >60 ML/MIN/1.73 M^2
GLUCOSE SERPL-MCNC: 149 MG/DL (ref 70–110)
POTASSIUM SERPL-SCNC: 3.8 MMOL/L (ref 3.5–5.1)
SODIUM SERPL-SCNC: 138 MMOL/L (ref 136–145)
UUN UR-MCNC: 24 MG/DL (ref 7–17)

## 2023-05-19 PROCEDURE — 36415 COLL VENOUS BLD VENIPUNCTURE: CPT | Mod: PO | Performed by: STUDENT IN AN ORGANIZED HEALTH CARE EDUCATION/TRAINING PROGRAM

## 2023-05-19 PROCEDURE — 80048 BASIC METABOLIC PNL TOTAL CA: CPT | Mod: PO | Performed by: STUDENT IN AN ORGANIZED HEALTH CARE EDUCATION/TRAINING PROGRAM

## 2023-05-23 ENCOUNTER — PATIENT MESSAGE (OUTPATIENT)
Dept: FAMILY MEDICINE | Facility: CLINIC | Age: 88
End: 2023-05-23
Payer: MEDICARE

## 2023-05-24 ENCOUNTER — TELEPHONE (OUTPATIENT)
Dept: FAMILY MEDICINE | Facility: CLINIC | Age: 88
End: 2023-05-24
Payer: MEDICARE

## 2023-05-24 NOTE — TELEPHONE ENCOUNTER
Spoke with Abimbola. She wanted to know if we could send over the BMP order for pt to have drawn. She wanted to know if this lab needed to be drawn today or would we be doing it weekly.       I asked Dr Bui and she stated that she wanted BMP to be drawn weekly.       Informed Abimbola. She verbalized understanding. Informed her that I will fax over the lab order to her at [phone].

## 2023-05-24 NOTE — TELEPHONE ENCOUNTER
----- Message from Imani Smallwood sent at 5/24/2023 11:53 AM CDT -----  Contact: pt  Type:Call Back     Who Called:Abimbola with [facility]     Does the patient know what this is regarding?:Labs   Would the patient rather a call back or a response via [facility]? Call   Best Call Back Number:   Additional Information:     Abimbola stated the family would like to have labs drawn on pt and they need the orders

## 2023-05-30 ENCOUNTER — EXTERNAL HOME HEALTH (OUTPATIENT)
Dept: HOME HEALTH SERVICES | Facility: HOSPITAL | Age: 88
End: 2023-05-30
Payer: MEDICARE

## 2023-06-14 ENCOUNTER — LAB VISIT (OUTPATIENT)
Dept: LAB | Facility: HOSPITAL | Age: 88
End: 2023-06-14
Attending: STUDENT IN AN ORGANIZED HEALTH CARE EDUCATION/TRAINING PROGRAM
Payer: MEDICARE

## 2023-06-14 DIAGNOSIS — E87.1 HYPONATREMIA: Primary | ICD-10-CM

## 2023-06-14 LAB
ANION GAP SERPL CALC-SCNC: 10 MMOL/L (ref 8–16)
CALCIUM SERPL-MCNC: 8.9 MG/DL (ref 8.7–10.5)
CHLORIDE SERPL-SCNC: 101 MMOL/L (ref 95–110)
CO2 SERPL-SCNC: 29 MMOL/L (ref 23–29)
CREAT SERPL-MCNC: 1.02 MG/DL (ref 0.5–1.4)
EST. GFR  (NO RACE VARIABLE): 53.6 ML/MIN/1.73 M^2
GLUCOSE SERPL-MCNC: 101 MG/DL (ref 70–110)
POTASSIUM SERPL-SCNC: 3.8 MMOL/L (ref 3.5–5.1)
SODIUM SERPL-SCNC: 140 MMOL/L (ref 136–145)
UUN UR-MCNC: 28 MG/DL (ref 7–17)

## 2023-06-14 PROCEDURE — 36415 COLL VENOUS BLD VENIPUNCTURE: CPT | Mod: PO | Performed by: STUDENT IN AN ORGANIZED HEALTH CARE EDUCATION/TRAINING PROGRAM

## 2023-06-14 PROCEDURE — 80048 BASIC METABOLIC PNL TOTAL CA: CPT | Mod: PO | Performed by: STUDENT IN AN ORGANIZED HEALTH CARE EDUCATION/TRAINING PROGRAM

## 2023-06-15 ENCOUNTER — DOCUMENT SCAN (OUTPATIENT)
Dept: HOME HEALTH SERVICES | Facility: HOSPITAL | Age: 88
End: 2023-06-15
Payer: MEDICARE

## 2023-06-22 ENCOUNTER — LAB VISIT (OUTPATIENT)
Dept: LAB | Facility: HOSPITAL | Age: 88
End: 2023-06-22
Attending: STUDENT IN AN ORGANIZED HEALTH CARE EDUCATION/TRAINING PROGRAM
Payer: MEDICARE

## 2023-06-22 DIAGNOSIS — E87.1 HYPONATREMIA: Primary | ICD-10-CM

## 2023-06-22 LAB
ANION GAP SERPL CALC-SCNC: 12 MMOL/L (ref 8–16)
CALCIUM SERPL-MCNC: 9.2 MG/DL (ref 8.7–10.5)
CHLORIDE SERPL-SCNC: 101 MMOL/L (ref 95–110)
CO2 SERPL-SCNC: 29 MMOL/L (ref 23–29)
CREAT SERPL-MCNC: 1.1 MG/DL (ref 0.5–1.4)
EST. GFR  (NO RACE VARIABLE): 48.9 ML/MIN/1.73 M^2
GLUCOSE SERPL-MCNC: 88 MG/DL (ref 70–110)
POTASSIUM SERPL-SCNC: 3.6 MMOL/L (ref 3.5–5.1)
SODIUM SERPL-SCNC: 142 MMOL/L (ref 136–145)
UUN UR-MCNC: 29 MG/DL (ref 7–17)

## 2023-06-22 PROCEDURE — 80048 BASIC METABOLIC PNL TOTAL CA: CPT | Mod: PO | Performed by: STUDENT IN AN ORGANIZED HEALTH CARE EDUCATION/TRAINING PROGRAM

## 2023-06-22 PROCEDURE — 36415 COLL VENOUS BLD VENIPUNCTURE: CPT | Mod: PO | Performed by: STUDENT IN AN ORGANIZED HEALTH CARE EDUCATION/TRAINING PROGRAM

## 2023-09-16 ENCOUNTER — PATIENT MESSAGE (OUTPATIENT)
Dept: FAMILY MEDICINE | Facility: CLINIC | Age: 88
End: 2023-09-16
Payer: MEDICARE

## 2023-09-16 DIAGNOSIS — E87.1 HYPONATREMIA: Primary | ICD-10-CM

## 2023-09-28 ENCOUNTER — OFFICE VISIT (OUTPATIENT)
Dept: FAMILY MEDICINE | Facility: CLINIC | Age: 88
End: 2023-09-28
Payer: MEDICARE

## 2023-09-28 VITALS
HEART RATE: 105 BPM | OXYGEN SATURATION: 95 % | BODY MASS INDEX: 18.58 KG/M2 | SYSTOLIC BLOOD PRESSURE: 126 MMHG | HEIGHT: 62 IN | WEIGHT: 101 LBS | TEMPERATURE: 98 F | DIASTOLIC BLOOD PRESSURE: 84 MMHG

## 2023-09-28 DIAGNOSIS — Z98.61 CAD S/P PERCUTANEOUS CORONARY ANGIOPLASTY: ICD-10-CM

## 2023-09-28 DIAGNOSIS — F02.B0 MODERATE LEWY BODY DEMENTIA WITHOUT BEHAVIORAL DISTURBANCE, PSYCHOTIC DISTURBANCE, MOOD DISTURBANCE, OR ANXIETY: Primary | ICD-10-CM

## 2023-09-28 DIAGNOSIS — G31.83 MODERATE LEWY BODY DEMENTIA WITHOUT BEHAVIORAL DISTURBANCE, PSYCHOTIC DISTURBANCE, MOOD DISTURBANCE, OR ANXIETY: Primary | ICD-10-CM

## 2023-09-28 DIAGNOSIS — I25.10 CAD S/P PERCUTANEOUS CORONARY ANGIOPLASTY: ICD-10-CM

## 2023-09-28 DIAGNOSIS — E21.3 HYPERPARATHYROIDISM: ICD-10-CM

## 2023-09-28 DIAGNOSIS — G20.A1 PARKINSON'S DISEASE: ICD-10-CM

## 2023-09-28 DIAGNOSIS — N18.31 CHRONIC KIDNEY DISEASE, STAGE 3A: ICD-10-CM

## 2023-09-28 DIAGNOSIS — E03.9 ACQUIRED HYPOTHYROIDISM: ICD-10-CM

## 2023-09-28 DIAGNOSIS — K21.9 GASTROESOPHAGEAL REFLUX DISEASE, UNSPECIFIED WHETHER ESOPHAGITIS PRESENT: ICD-10-CM

## 2023-09-28 DIAGNOSIS — I11.0 HYPERTENSIVE HEART DISEASE WITH HEART FAILURE: ICD-10-CM

## 2023-09-28 PROBLEM — R63.8 INADEQUATE ORAL INTAKE: Status: RESOLVED | Noted: 2022-07-29 | Resolved: 2023-09-28

## 2023-09-28 PROCEDURE — 99999 PR PBB SHADOW E&M-EST. PATIENT-LVL III: CPT | Mod: PBBFAC,,, | Performed by: STUDENT IN AN ORGANIZED HEALTH CARE EDUCATION/TRAINING PROGRAM

## 2023-09-28 PROCEDURE — 99213 OFFICE O/P EST LOW 20 MIN: CPT | Mod: PBBFAC,PN | Performed by: STUDENT IN AN ORGANIZED HEALTH CARE EDUCATION/TRAINING PROGRAM

## 2023-09-28 PROCEDURE — 99215 PR OFFICE/OUTPT VISIT, EST, LEVL V, 40-54 MIN: ICD-10-PCS | Mod: S$PBB,,, | Performed by: STUDENT IN AN ORGANIZED HEALTH CARE EDUCATION/TRAINING PROGRAM

## 2023-09-28 PROCEDURE — 99215 OFFICE O/P EST HI 40 MIN: CPT | Mod: S$PBB,,, | Performed by: STUDENT IN AN ORGANIZED HEALTH CARE EDUCATION/TRAINING PROGRAM

## 2023-09-28 PROCEDURE — 99999 PR PBB SHADOW E&M-EST. PATIENT-LVL III: ICD-10-PCS | Mod: PBBFAC,,, | Performed by: STUDENT IN AN ORGANIZED HEALTH CARE EDUCATION/TRAINING PROGRAM

## 2023-09-28 RX ORDER — LEVOTHYROXINE SODIUM 25 UG/1
25 TABLET ORAL EVERY MORNING
COMMUNITY
Start: 2023-08-19

## 2023-09-28 NOTE — PROGRESS NOTES
Subjective:       Patient ID: Mauri Matias is a 87 y.o. female.    Chief Complaint: Follow-up (Pt here for a 6 month follow up )      Active Problem List with Overview Notes    Diagnosis Date Noted    Parkinson's disease 01/23/2023     Follows with neuro  Sinemet 25/100 2.5 tabs BID  exelon       Chronic kidney disease, stage 3a 09/19/2022     Follows with nephology   Back to baseline  SIADH 2/2 SSRI now off  Better appetite   Sodium levels stable         Hyperparathyroidism 07/30/2022     Follows with nephrology       Lewy body dementia 07/28/2022     Follows with neuro  Sinemet 25/100 2.5 tabs BID  exelon   Progression of disease lately       GERD (gastroesophageal reflux disease) 12/04/2019     Diet controlled      Acquired hypothyroidism 08/08/2016     Synthroid 25  Stable  asymptomatic       CAD S/P percutaneous coronary angioplasty 08/02/2016     Follows with cards; [facility]      Hypertensive heart disease with heart failure      Home readings are controlled  Follows with [facility] q6 months  Always elevated at doctors due to stress   Losartan 25 BID           Review of Systems   All other systems reviewed and are negative.       A1C:      CBC:  Recent Labs   Lab 05/14/23  1610 06/05/23  0747 07/17/23  1532   WBC 11.63 5.63 7.76   RBC 4.96 4.36 4.58   Hemoglobin 15.8 13.5 14.1   Hematocrit 45.1 41.6 43.0   Platelets 261 219 262   MCV 91 95 94   MCH 31.9 H 31.0 30.8   MCHC 35.0 32.5 32.8     CMP:  Recent Labs   Lab 05/31/23  1200 06/05/23  0747 06/07/23  1415 07/17/23  1532 09/20/23  1053   Glucose 95 87   < > 112 H  112 H 85   Calcium 9.5 8.6 L   < > 9.5  9.5 9.4   Albumin 4.3 4.0  --  4.1  --    Total Protein 7.1 6.6  --  7.0  --    Sodium 136 137   < > 138  138 139   Potassium 4.3 3.8   < > 4.3  4.3 4.1   CO2 27 30 H   < > 27  27 28   Chloride 100 102   < > 100  100 101   BUN 33 H 26 H   < > 28 H  28 H 35 H   Creatinine 1.03 0.86   < > 0.97  0.97 0.95   Alkaline Phosphatase 70 61  --  74  --    ALT 7  L 6 L  --  9 L  --    AST 25 24  --  26  --    Total Bilirubin 0.7 0.6  --  0.5  --     < > = values in this interval not displayed.     LIPIDS:  Recent Labs   Lab 06/05/23  0747   TSH 2.540   HDL 48   Cholesterol 204 H   Triglycerides 99   LDL Cholesterol 136.2   HDL/Cholesterol Ratio 23.5   Non-HDL Cholesterol 156   Total Cholesterol/HDL Ratio 4.3     TSH:  Recent Labs   Lab 07/28/22  0601 05/15/23  1423 06/05/23  0747   TSH 0.928 1.340 2.540        Objective:      Vitals:    09/28/23 1447   BP: 126/84   Pulse: 105   Temp: 97.9 °F (36.6 °C)      Physical Exam  Vitals reviewed.   Constitutional:       Appearance: Normal appearance. She is underweight.   HENT:      Head: Normocephalic and atraumatic.   Eyes:      Conjunctiva/sclera: Conjunctivae normal.   Cardiovascular:      Rate and Rhythm: Normal rate and regular rhythm.      Heart sounds: Normal heart sounds.   Pulmonary:      Effort: Pulmonary effort is normal.      Breath sounds: Normal breath sounds.   Abdominal:      Palpations: Abdomen is soft.      Tenderness: There is no abdominal tenderness.   Musculoskeletal:         General: Normal range of motion.      Cervical back: Normal range of motion.      Right lower leg: No edema.      Left lower leg: No edema.   Neurological:      Mental Status: She is alert. Mental status is at baseline.   Psychiatric:         Mood and Affect: Mood normal.         Behavior: Behavior normal.          Assessment:       1. Moderate Lewy body dementia without behavioral disturbance, psychotic disturbance, mood disturbance, or anxiety    2. Parkinson's disease    3. Hypertensive heart disease with heart failure    4. CAD S/P percutaneous coronary angioplasty    5. Chronic kidney disease, stage 3a    6. Acquired hypothyroidism    7. Hyperparathyroidism    8. Gastroesophageal reflux disease, unspecified whether esophagitis present        Plan:   1. Moderate Lewy body dementia without behavioral disturbance, psychotic disturbance,  mood disturbance, or anxiety    2. Parkinson's disease    3. Hypertensive heart disease with heart failure    4. CAD S/P percutaneous coronary angioplasty    5. Chronic kidney disease, stage 3a    6. Acquired hypothyroidism    7. Hyperparathyroidism    8. Gastroesophageal reflux disease, unspecified whether esophagitis present     Labs reviewed in detail  Continue healthy lifestyle efforts  Continue current meds as prescribed otherwise; refills per request  Keep routine specialist f/u   RTC in 1 year with labs prior and/or PRN          Mackenzie [facility] Medicine   9/28/23     I spent a total of >40 minutes on the day of the visit.This includes face to face time and non-face to face time preparing to see the patient (eg, review of tests), obtaining and/or reviewing separately obtained history, documenting clinical information in the electronic or other health record, independently interpreting results and communicating results to the patient/family/caregiver, or care coordinator.

## 2023-11-25 DIAGNOSIS — I11.9 HYPERTENSIVE HEART DISEASE WITHOUT HEART FAILURE: ICD-10-CM

## 2023-11-25 RX ORDER — LOSARTAN POTASSIUM 25 MG/1
TABLET ORAL
Qty: 90 TABLET | Refills: 3 | Status: SHIPPED | OUTPATIENT
Start: 2023-11-25

## 2023-11-25 NOTE — TELEPHONE ENCOUNTER
Refill Decision Note   Mauri Matias  is requesting a refill authorization.  Brief Assessment and Rationale for Refill:  Approve     Medication Therapy Plan:         Comments:     Note composed:10:29 AM 11/25/2023

## 2023-11-25 NOTE — TELEPHONE ENCOUNTER
No care due was identified.  [facility] Embedded Care Due Messages. Reference number: [id number].   11/25/2023 2:08:04 AM CST

## 2024-01-11 DIAGNOSIS — Z00.00 ENCOUNTER FOR MEDICARE ANNUAL WELLNESS EXAM: ICD-10-CM

## 2024-06-05 ENCOUNTER — PATIENT MESSAGE (OUTPATIENT)
Dept: FAMILY MEDICINE | Facility: CLINIC | Age: 89
End: 2024-06-05
Payer: MEDICARE

## 2024-06-05 DIAGNOSIS — R41.89 COGNITIVE DECLINE: Primary | ICD-10-CM

## 2024-06-05 DIAGNOSIS — R68.89 OTHER GENERAL SYMPTOMS AND SIGNS: ICD-10-CM

## 2024-07-17 DIAGNOSIS — M81.0 OP (OSTEOPOROSIS): Primary | ICD-10-CM

## 2025-01-13 DIAGNOSIS — Z00.00 ENCOUNTER FOR MEDICARE ANNUAL WELLNESS EXAM: ICD-10-CM

## 2025-02-25 ENCOUNTER — PATIENT OUTREACH (OUTPATIENT)
Dept: ADMINISTRATIVE | Facility: HOSPITAL | Age: 89
End: 2025-02-25
Payer: MEDICARE

## 2025-02-26 NOTE — PROGRESS NOTES
[facility]  Medical Home Visit    Visit Date: 10/31/2022  Encounter Provider: Liz Lange  PCP:  Mackenzie Bui DO    Subjective:      Patient ID: Mauri Matias is a 86 y.o. female.    Consult Requested By:  No ref. provider found  Reason for Consult:  Routine FU, HTN Dementia    Mauri is being seen at home due to being seen at home due to physical debility that presents a taxing effort to leave the home, to mitigate high risk of hospital readmission and/or due to the limited availability of reliable or safe options for transportation to the point of access to the provider. Prior to treatment on this visit the chart was reviewed and patient verbal consent was obtained.    Chief Complaint: Hypertension and Dementia      Pt is being seen today in her home with her  present for routine follow up.  She is awake and alert, ambulating in her home.  Her  answers most questions, she seems to understand questions asked of her but cannot form the words to answer. She refers to her  to answer most questions.  She reports she is having some pain in her ankle, she received a cortisone injection a few days ago which does not seen to be much help. She is taking Tylenol for pain.   reports Lyrica was prescribed and he has picked up from pharmacy but has not started giving her that medication yet as he is not sure he wants to with the side effect profile.    Pt has had a previous problem with hyponatremia. She has been having biweekly labs and results have been WNL for past 2 months. She is followed by nephrology  She had stopped some medications to avoid hyponatremia, but recently resumed Rivastigmine with Na levels monitored. She had stopped Celexa as well. Celexa not currently on med list in home, recent fill noted at [facility] on 10/2/22.  does not believe she is taking this now.    No recent falls  At previous visit, family was requesting appetite stimulant. Today,   reports appetite is fair, she will eat if she likes the food but he does not believe she is gaining weight. Reviewed that appetite stimulants are not ideal for dementia pts, continue to offer more palatable foods    Pt has labs due to be drawn. Labs collect today during visit. Labs collected from right arm, pt tolerated well, pressure held. Labs transported          Review of Systems   Constitutional:  Positive for appetite change (fluctuates). Negative for activity change, fatigue and fever.   HENT: Negative.     Eyes: Negative.    Respiratory:  Negative for chest tightness.    Cardiovascular: Negative.  Negative for leg swelling.   Gastrointestinal: Negative.    Endocrine: Negative.    Genitourinary: Negative.    Musculoskeletal:  Positive for arthralgias. Back pain: ankle pain.  Skin: Negative.    Allergic/Immunologic: Negative.    Neurological: Negative.    Hematological: Negative.    Psychiatric/Behavioral:  Positive for confusion. Negative for agitation.    All other systems reviewed and are negative.    Assessments:  Environmental: single family home, lives with spouse, open paths  Functional Status: requires some assistance  Safety: No noted issues  Nutritional: adequate available  Home Health/DME/Supplies: None    Objective:   Physical Exam  Vitals reviewed.   Constitutional:       General: She is not in acute distress.     Appearance: She is well-developed.      Comments: thin   HENT:      Head: Normocephalic and atraumatic.      Nose: Nose normal.      Mouth/Throat:      Mouth: Mucous membranes are dry.   Eyes:      Pupils: Pupils are equal, round, and reactive to light.   Cardiovascular:      Rate and Rhythm: Normal rate and regular rhythm.      Heart sounds: Normal heart sounds.   Pulmonary:      Effort: Pulmonary effort is normal.      Breath sounds: Normal breath sounds.   Abdominal:      General: Bowel sounds are normal.      Palpations: Abdomen is soft.   Musculoskeletal:         General: Normal  range of motion.      Cervical back: Normal range of motion and neck supple.   Skin:     General: Skin is warm and dry.   Neurological:      Mental Status: She is alert. Mental status is at baseline. She is disoriented.      Cranial Nerves: No cranial nerve deficit.   Psychiatric:         Behavior: Behavior normal.       Vitals:    10/31/22 1038   BP: 138/80   Pulse: 77   Resp: 16   SpO2: 97%     There is no height or weight on file to calculate BMI.    Assessment:     1. Moderate Lewy body dementia without behavioral disturbance, psychotic disturbance, mood disturbance, or anxiety    2. Hypertensive heart disease without heart failure        Plan:            Problem List Items Addressed This Visit          Neuro    Lewy body dementia    Current Assessment & Plan     Followed by neurology  Oriented to self and  today  Difficulty finding words to answer questions  Sinemet in place  Recently resumed Rivastigmine-nephrology monitoring Na              Cardiac/Vascular    Hypertensive heart disease without heart failure    Current Assessment & Plan     Readings per family log are high normals  Has only used Hydralazine twice this month  Taking Losartan 25mg daily at this time  Monitor               Were controlled substances prescribed?  No  Total visit time: 65 min  Follow Up Appointments:   Future Appointments   Date Time Provider Department Center   10/31/2022  2:30 PM [facility], [facility]   11/10/2022  1:30 PM Rosey Mendoza MD [facility]   1/23/2023  8:00 AM Liz Lange NP [facility] [place]   3/27/2023  2:40 PM DO FERNANDA [facility] [place]       Signature: Liz Lange NP        Ataxia

## 2025-04-16 ENCOUNTER — PATIENT MESSAGE (OUTPATIENT)
Dept: FAMILY MEDICINE | Facility: CLINIC | Age: 89
End: 2025-04-16
Payer: MEDICARE

## 2025-04-16 RX ORDER — NITROFURANTOIN 25; 75 MG/1; MG/1
100 CAPSULE ORAL 2 TIMES DAILY
Qty: 20 CAPSULE | Refills: 0 | Status: SHIPPED | OUTPATIENT
Start: 2025-04-16

## 2025-04-17 DIAGNOSIS — R68.89 OTHER GENERAL SYMPTOMS AND SIGNS: Primary | ICD-10-CM

## 2025-09-03 ENCOUNTER — PATIENT MESSAGE (OUTPATIENT)
Dept: FAMILY MEDICINE | Facility: CLINIC | Age: 89
End: 2025-09-03
Payer: MEDICARE